# Patient Record
Sex: FEMALE | Race: WHITE | NOT HISPANIC OR LATINO | Employment: FULL TIME | URBAN - METROPOLITAN AREA
[De-identification: names, ages, dates, MRNs, and addresses within clinical notes are randomized per-mention and may not be internally consistent; named-entity substitution may affect disease eponyms.]

---

## 2017-04-26 ENCOUNTER — OFFICE VISIT (OUTPATIENT)
Dept: RETAIL CLINIC | Facility: CLINIC | Age: 27
End: 2017-04-26

## 2017-04-26 VITALS
TEMPERATURE: 99.1 F | DIASTOLIC BLOOD PRESSURE: 80 MMHG | HEART RATE: 116 BPM | OXYGEN SATURATION: 99 % | SYSTOLIC BLOOD PRESSURE: 128 MMHG | RESPIRATION RATE: 16 BRPM

## 2017-04-26 DIAGNOSIS — J11.1 INFLUENZA: Primary | ICD-10-CM

## 2017-04-26 DIAGNOSIS — H66.41 SUPPURATIVE OTITIS MEDIA OF RIGHT EAR, UNSPECIFIED CHRONICITY: ICD-10-CM

## 2017-04-26 PROCEDURE — 99213 OFFICE O/P EST LOW 20 MIN: CPT | Performed by: NURSE PRACTITIONER

## 2017-04-26 RX ORDER — OSELTAMIVIR PHOSPHATE 75 MG/1
75 CAPSULE ORAL 2 TIMES DAILY
Qty: 10 CAPSULE | Refills: 0 | Status: SHIPPED | OUTPATIENT
Start: 2017-04-26 | End: 2017-05-01

## 2017-04-26 RX ORDER — AMOXICILLIN 500 MG/1
500 CAPSULE ORAL 3 TIMES DAILY
Qty: 30 CAPSULE | Refills: 0 | Status: SHIPPED | OUTPATIENT
Start: 2017-04-26 | End: 2017-05-06

## 2017-04-26 NOTE — PROGRESS NOTES
Subjective:     Linda Li is a 26 y.o.     Earache    This is a new problem. The current episode started yesterday. The maximum temperature recorded prior to her arrival was 101 - 101.9 F. Associated symptoms include diarrhea, headaches and a sore throat. Pertinent negatives include no coughing or vomiting. Treatments tried: nyquil, mucinex, advil.         The following portions of the patient's history were reviewed and updated as appropriate: allergies, current medications, past family history, past medical history, past social history, past surgical history and problem list.      Review of Systems   Constitutional: Positive for appetite change, fatigue and fever.   HENT: Positive for congestion, ear pain, sinus pressure and sore throat.    Respiratory: Negative for cough, shortness of breath and wheezing.    Cardiovascular: Negative.    Gastrointestinal: Positive for diarrhea and nausea. Negative for vomiting.   Musculoskeletal: Positive for myalgias.   Skin: Negative for color change and pallor.   Neurological: Positive for headaches. Negative for dizziness and light-headedness.         Objective:      Physical Exam   Constitutional: She is oriented to person, place, and time. She appears well-developed and well-nourished. She is cooperative.   HENT:   Head: Normocephalic and atraumatic.   Right Ear: Tympanic membrane is erythematous and bulging.   Left Ear: Tympanic membrane is not erythematous and not bulging.   Nose: Nose normal.   Mouth/Throat: Posterior oropharyngeal erythema present. No oropharyngeal exudate.   Eyes: EOM and lids are normal. Pupils are equal, round, and reactive to light. Right conjunctiva is injected. Left conjunctiva is injected.   Neck: Normal range of motion. Neck supple.   Cardiovascular: Normal rate, regular rhythm, S1 normal, S2 normal and normal heart sounds.    Pulmonary/Chest: Effort normal and breath sounds normal.   Abdominal: Soft. Normal appearance and bowel sounds are  normal. There is no tenderness.   Musculoskeletal: Normal range of motion.   Lymphadenopathy:     She has cervical adenopathy.   Neurological: She is alert and oriented to person, place, and time.   Skin: Skin is warm, dry and intact.   Psychiatric: She has a normal mood and affect. Her speech is normal and behavior is normal.   Vitals reviewed.          Linda was seen today for earache.    Diagnoses and all orders for this visit:    Influenza    Suppurative otitis media of right ear, unspecified chronicity    Other orders  -     amoxicillin (AMOXIL) 500 MG capsule; Take 1 capsule by mouth 3 (Three) Times a Day for 10 days.  -     oseltamivir (TAMIFLU) 75 MG capsule; Take 1 capsule by mouth 2 (Two) Times a Day for 5 days.

## 2017-04-26 NOTE — PATIENT INSTRUCTIONS
Otitis Media, Adult  Otitis media is redness, soreness, and inflammation of the middle ear. Otitis media may be caused by allergies or, most commonly, by infection. Often it occurs as a complication of the common cold.  SIGNS AND SYMPTOMS  Symptoms of otitis media may include:  · Earache.  · Fever.  · Ringing in your ear.  · Headache.  · Leakage of fluid from the ear.  DIAGNOSIS  To diagnose otitis media, your health care provider will examine your ear with an otoscope. This is an instrument that allows your health care provider to see into your ear in order to examine your eardrum. Your health care provider also will ask you questions about your symptoms.  TREATMENT   Typically, otitis media resolves on its own within 3-5 days. Your health care provider may prescribe medicine to ease your symptoms of pain. If otitis media does not resolve within 5 days or is recurrent, your health care provider may prescribe antibiotic medicines if he or she suspects that a bacterial infection is the cause.  HOME CARE INSTRUCTIONS   · If you were prescribed an antibiotic medicine, finish it all even if you start to feel better.  · Take medicines only as directed by your health care provider.  · Keep all follow-up visits as directed by your health care provider.  SEEK MEDICAL CARE IF:  · You have otitis media only in one ear, or bleeding from your nose, or both.  · You notice a lump on your neck.  · You are not getting better in 3-5 days.  · You feel worse instead of better.  SEEK IMMEDIATE MEDICAL CARE IF:   · You have pain that is not controlled with medicine.  · You have swelling, redness, or pain around your ear or stiffness in your neck.  · You notice that part of your face is paralyzed.  · You notice that the bone behind your ear (mastoid) is tender when you touch it.  MAKE SURE YOU:   · Understand these instructions.  · Will watch your condition.  · Will get help right away if you are not doing well or get worse.     This  "information is not intended to replace advice given to you by your health care provider. Make sure you discuss any questions you have with your health care provider.     Document Released: 09/22/2005 Document Revised: 01/08/2016 Document Reviewed: 07/15/2014  [employer] Patient Education ©2016 [employer]  Influenza, Adult  Influenza, more commonly known as \"the flu,\" is a viral infection that primarily affects the respiratory tract. The respiratory tract includes organs that help you breathe, such as the lungs, nose, and throat. The flu causes many common cold symptoms, as well as a high fever and body aches.  The flu spreads easily from person to person (is contagious). Getting a flu shot (influenza vaccination) every year is the best way to prevent influenza.  CAUSES  Influenza is caused by a virus. You can catch the virus by:  · Breathing in droplets from an infected person's cough or sneeze.  · Touching something that was recently contaminated with the virus and then touching your mouth, nose, or eyes.  RISK FACTORS  The following factors may make you more likely to get the flu:  · Not cleaning your hands frequently with soap and water or alcohol-based hand .  · Having close contact with many people during cold and flu season.  · Touching your mouth, eyes, or nose without washing or sanitizing your hands first.  · Not drinking enough fluids or not eating a healthy diet.  · Not getting enough sleep or exercise.  · Being under a high amount of stress.  · Not getting a yearly (annual) flu shot.  You may be at a higher risk of complications from the flu, such as a severe lung infection (pneumonia), if you:  · Are over the age of 65.  · Are pregnant.  · Have a weakened disease-fighting system (immune system). You may have a weakened immune system if you:    Have HIV or AIDS.    Are undergoing chemotherapy.    Are taking medicines that reduce the activity of (suppress) the immune system.  · Have " a long-term (chronic) illness, such as heart disease, kidney disease, diabetes, or lung disease.  · Have a liver disorder.  · Are obese.  · Have anemia.  SYMPTOMS  Symptoms of this condition typically last 4-10 days and may include:  · Fever.  · Chills.  · Headache, body aches, or muscle aches.  · Sore throat.  · Cough.  · Runny or congested nose.  · Chest discomfort and cough.  · Poor appetite.  · Weakness or tiredness (fatigue).  · Dizziness.  · Nausea or vomiting.  DIAGNOSIS  This condition may be diagnosed based on your medical history and a physical exam. Your health care provider may do a nose or throat swab test to confirm the diagnosis.  TREATMENT  If influenza is detected early, you can be treated with antiviral medicine that can reduce the length of your illness and the severity of your symptoms. This medicine may be given by mouth (orally) or through an IV tube that is inserted in one of your veins.  The goal of treatment is to relieve symptoms by taking care of yourself at home. This may include taking over-the-counter medicines, drinking plenty of fluids, and adding humidity to the air in your home.  In some cases, influenza goes away on its own. Severe influenza or complications from influenza may be treated in a hospital.  HOME CARE INSTRUCTIONS  · Take over-the-counter and prescription medicines only as told by your health care provider.  · Use a cool mist humidifier to add humidity to the air in your home. This can make breathing easier.  · Rest as needed.  · Drink enough fluid to keep your urine clear or pale yellow.  · Cover your mouth and nose when you cough or sneeze.  · Wash your hands with soap and water often, especially after you cough or sneeze. If soap and water are not available, use hand .  · Stay home from work or school as told by your health care provider. Unless you are visiting your health care provider, try to avoid leaving home until your fever has been gone for 24 hours  without the use of medicine.  · Keep all follow-up visits as told by your health care provider. This is important.  PREVENTION  · Getting an annual flu shot is the best way to avoid getting the flu. You may get the flu shot in late summer, fall, or winter. Ask your health care provider when you should get your flu shot.  · Wash your hands often or use hand  often.  · Avoid contact with people who are sick during cold and flu season.  · Eat a healthy diet, drink plenty of fluids, get enough sleep, and exercise regularly.  SEEK MEDICAL CARE IF:  · You develop new symptoms.  · You have:    Chest pain.    Diarrhea.    A fever.  · Your cough gets worse.  · You produce more mucus.  · You feel nauseous or you vomit.  SEEK IMMEDIATE MEDICAL CARE IF:  · You develop shortness of breath or difficulty breathing.  · Your skin or nails turn a bluish color.  · You have severe pain or stiffness in your neck.  · You develop a sudden headache or sudden pain in your face or ear.  · You cannot stop vomiting.     This information is not intended to replace advice given to you by your health care provider. Make sure you discuss any questions you have with your health care provider.     Document Released: 12/15/2001 Document Revised: 01/13/2017 Document Reviewed: 10/11/2016  [employer] Interactive Patient Education ©2016 [employer]

## 2017-06-27 ENCOUNTER — TRANSCRIBE ORDERS (OUTPATIENT)
Dept: LAB | Facility: HOSPITAL | Age: 27
End: 2017-06-27

## 2017-06-27 ENCOUNTER — LAB (OUTPATIENT)
Dept: LAB | Facility: HOSPITAL | Age: 27
End: 2017-06-27

## 2017-06-27 DIAGNOSIS — H53.9 VISUAL CHANGES: Primary | ICD-10-CM

## 2017-06-27 DIAGNOSIS — H53.9 VISUAL CHANGES: ICD-10-CM

## 2017-06-27 LAB
ALBUMIN SERPL-MCNC: 4.5 G/DL (ref 3.5–5.2)
ALBUMIN/GLOB SERPL: 1.4 G/DL
ALP SERPL-CCNC: 62 U/L (ref 39–117)
ALT SERPL W P-5'-P-CCNC: 15 U/L (ref 1–33)
ANION GAP SERPL CALCULATED.3IONS-SCNC: 12 MMOL/L
AST SERPL-CCNC: 18 U/L (ref 1–32)
BILIRUB SERPL-MCNC: 1.1 MG/DL (ref 0.1–1.2)
BUN BLD-MCNC: 10 MG/DL (ref 6–20)
BUN/CREAT SERPL: 10.2 (ref 7–25)
CALCIUM SPEC-SCNC: 9.9 MG/DL (ref 8.6–10.5)
CHLORIDE SERPL-SCNC: 104 MMOL/L (ref 98–107)
CO2 SERPL-SCNC: 24 MMOL/L (ref 22–29)
CREAT BLD-MCNC: 0.98 MG/DL (ref 0.57–1)
GFR SERPL CREATININE-BSD FRML MDRD: 69 ML/MIN/1.73
GLOBULIN UR ELPH-MCNC: 3.2 GM/DL
GLUCOSE BLD-MCNC: 95 MG/DL (ref 65–99)
HBA1C MFR BLD: 4.9 % (ref 4.8–5.6)
POTASSIUM BLD-SCNC: 4.7 MMOL/L (ref 3.5–5.2)
PROT SERPL-MCNC: 7.7 G/DL (ref 6–8.5)
SODIUM BLD-SCNC: 140 MMOL/L (ref 136–145)

## 2017-06-27 PROCEDURE — 80053 COMPREHEN METABOLIC PANEL: CPT

## 2017-06-27 PROCEDURE — 36415 COLL VENOUS BLD VENIPUNCTURE: CPT

## 2017-06-27 PROCEDURE — 83036 HEMOGLOBIN GLYCOSYLATED A1C: CPT

## 2017-10-13 ENCOUNTER — TRANSCRIBE ORDERS (OUTPATIENT)
Dept: ADMINISTRATIVE | Facility: HOSPITAL | Age: 27
End: 2017-10-13

## 2017-10-13 DIAGNOSIS — M25.561 CHRONIC PAIN OF RIGHT KNEE: Primary | ICD-10-CM

## 2017-10-13 DIAGNOSIS — G89.29 CHRONIC PAIN OF RIGHT KNEE: Primary | ICD-10-CM

## 2017-10-17 ENCOUNTER — HOSPITAL ENCOUNTER (OUTPATIENT)
Dept: GENERAL RADIOLOGY | Facility: HOSPITAL | Age: 27
Discharge: HOME | End: 2017-10-17
Admitting: NURSE PRACTITIONER

## 2017-10-17 DIAGNOSIS — R52 PAIN: ICD-10-CM

## 2017-10-17 PROCEDURE — 73562 X-RAY EXAM OF KNEE 3: CPT

## 2017-10-24 ENCOUNTER — HOSPITAL ENCOUNTER (OUTPATIENT)
Dept: MRI IMAGING | Facility: HOSPITAL | Age: 27
Discharge: HOME | End: 2017-10-24
Admitting: NURSE PRACTITIONER

## 2017-10-24 DIAGNOSIS — G89.29 CHRONIC PAIN OF RIGHT KNEE: ICD-10-CM

## 2017-10-24 DIAGNOSIS — M25.561 CHRONIC PAIN OF RIGHT KNEE: ICD-10-CM

## 2017-10-24 PROCEDURE — 73721 MRI JNT OF LWR EXTRE W/O DYE: CPT

## 2017-10-27 ENCOUNTER — OFFICE VISIT (OUTPATIENT)
Dept: ORTHOPEDIC SURGERY | Facility: CLINIC | Age: 27
End: 2017-10-27

## 2017-10-27 VITALS — HEIGHT: 70 IN | BODY MASS INDEX: 33.64 KG/M2 | WEIGHT: 235 LBS | TEMPERATURE: 98.6 F

## 2017-10-27 DIAGNOSIS — M76.51 PATELLAR TENDINITIS OF RIGHT KNEE: Primary | ICD-10-CM

## 2017-10-27 PROCEDURE — 99203 OFFICE O/P NEW LOW 30 MIN: CPT | Performed by: ORTHOPAEDIC SURGERY

## 2017-10-27 RX ORDER — SUMATRIPTAN 5 MG/1
1 SPRAY NASAL
COMMUNITY
End: 2020-12-09

## 2017-10-27 RX ORDER — MELOXICAM 7.5 MG/1
15 TABLET ORAL DAILY
Status: SHIPPED | OUTPATIENT
Start: 2017-10-27 | End: 2017-12-26

## 2017-10-27 RX ORDER — MELOXICAM 15 MG/1
TABLET ORAL
Qty: 60 TABLET | Refills: 3 | Status: SHIPPED | OUTPATIENT
Start: 2017-10-27 | End: 2020-12-09
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
## 2017-11-02 ENCOUNTER — HOSPITAL ENCOUNTER (OUTPATIENT)
Dept: PHYSICAL THERAPY | Facility: HOSPITAL | Age: 27
Setting detail: THERAPIES SERIES
Discharge: HOME | End: 2017-11-02
Attending: ORTHOPAEDIC SURGERY

## 2017-11-02 DIAGNOSIS — M25.561 RIGHT KNEE PAIN, UNSPECIFIED CHRONICITY: Primary | ICD-10-CM

## 2017-11-02 DIAGNOSIS — Z74.09 IMPAIRED MOBILITY: ICD-10-CM

## 2017-11-02 PROCEDURE — 97161 PT EVAL LOW COMPLEX 20 MIN: CPT | Performed by: PHYSICAL THERAPIST

## 2017-11-02 PROCEDURE — 97110 THERAPEUTIC EXERCISES: CPT | Performed by: PHYSICAL THERAPIST
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
## 2017-11-17 ENCOUNTER — HOSPITAL ENCOUNTER (OUTPATIENT)
Dept: PHYSICAL THERAPY | Facility: HOSPITAL | Age: 27
Setting detail: THERAPIES SERIES
Discharge: HOME | End: 2017-11-17

## 2017-11-17 DIAGNOSIS — Z74.09 IMPAIRED MOBILITY: ICD-10-CM

## 2017-11-17 DIAGNOSIS — M25.561 RIGHT KNEE PAIN, UNSPECIFIED CHRONICITY: Primary | ICD-10-CM

## 2017-11-17 PROCEDURE — 97033 APP MDLTY 1+IONTPHRSIS EA 15: CPT | Performed by: PHYSICAL THERAPIST

## 2017-11-17 PROCEDURE — 97140 MANUAL THERAPY 1/> REGIONS: CPT | Performed by: PHYSICAL THERAPIST

## 2017-11-17 NOTE — THERAPY TREATMENT NOTE
Outpatient Physical Therapy Ortho Treatment Note  [facility]     Patient Name: Linda Li  : 1990  MRN: [id number]  Today's Date: 2017      Visit Date: 2017    Visit Dx:    ICD-10-CM ICD-9-CM   1. Right knee pain, unspecified chronicity M25.561 719.46   2. Impaired mobility Z74.09 799.89       There is no problem list on file for this patient.       Past Medical History:   Diagnosis Date   • Anxiety    • Depression    • Migraine    • Migraines         Past Surgical History:   Procedure Laterality Date   • WISDOM TOOTH EXTRACTION                               PT Assessment/Plan       17 1526       PT Assessment    Assessment Comments Ms. Li returns for her first follow up today, reporting no changes in her condition and reporting compliance with HEP every other day.  Today, we initaited trial of DDN to R glut tissues and R quad tissues.  She demonstrates several moderate level LTR's.  We also initiated a course of iontophoresis with dexamethasone.  She is TTP R inferior pole of patella.    -GJ     PT Plan    PT Plan Comments assess response to DDN and ionto.  Consider repeat of DDn to R quads if beneficial.  consider warm up on bike, Consider LAQ 90-45 degrees with weight, focus eccentric phase.  possible RLE only leg press.    -GJ       User Key  (r) = Recorded By, (t) = Taken By, (c) = Cosigned By    Initials Name Provider Type    GUALBERTO Olivares, PT Physical Therapist                Modalities       17 1300          Iontophoresis 07002    Rx Minutes 18  -GJ      MA/Min 40  -GJ      Dexamethasone used Yes   1.5 cc  -GJ      Patch Type Butterfly   to R inferior patellar pole  -GJ        User Key  (r) = Recorded By, (t) = Taken By, (c) = Cosigned By    Initials Name Provider Type    GUALBERTO Olivares, PT Physical Therapist                Exercises       17 1300          Subjective Comments    Subjective Comments I haven't noticed much of a difference, been doing  exercises every other day.   -GJ      Subjective Pain    Able to rate subjective pain? yes  -      Pre-Treatment Pain Level 5  -GJ      Exercise 9    Exercise Name 9 --  -GJ      Time (Minutes) 9 --  -GJ        User Key  (r) = Recorded By, (t) = Taken By, (c) = Cosigned By    Initials Name Provider Type    GUALBERTO Olivares PT Physical Therapist                        Manual Rx (last 36 hours)      Manual Treatments       11/17/17 1500          Manual Rx 1    Manual Rx 1 Location intramuscular manual therapy  -GJ Assessed pt. for Dry Needling and intramuscular manual therapy. Discussed risks/benefits of Dry Needling with pt, including but not limited to muscle soreness, bruising, vasovagal response, pneumothorax, nerve injury. Patient signed written consent to proceed with treatment.    Patient position during treatment: L SL with pillow between knees, supine with RLE rested on pillow.  Muscles treated: R glut medius, R piriformis.  R vastus lateralis, R vastus intermedius,   Response to treatment: several multiple LTR's all tissues needled.  She tolerated without immediate adverse response.      palpation used before, during, and after to facilitate assessment Clean needle technique observed at all times, precautions for lung fields, neurovascular structures observed.    DDN performed by Luiz Olivares II, PT, DPT       User Key  (r) = Recorded By, (t) = Taken By, (c) = Cosigned By    Initials Name Provider Type    GUALBERTO Olivares PT Physical Therapist                PT OP Goals       11/17/17 1300       PT Short Term Goals    STG Date to Achieve 12/07/17  -     STG 1 pt. to be I with initial HEP to facilitate self management of their condition  -GJ     STG 1 Progress Ongoing  -GJ     STG 1 Progress Comments reviewed  -GJ     STG 2 pt. to be educated in/verbalize understanding of the importance of posture/ergonomics in association with their condition to facilitate self management of their condition  -      STG 2 Progress Ongoing  -GJ     STG 2 Progress Comments reviwed  -GJ     STG 3 pt to report sleeping through the night without interruption secondary to her knee pain to facilate optimal healing  -GJ     STG 3 Progress Ongoing  -GJ     STG 3 Progress Comments continues to report sleep disruption  -GJ     Long Term Goals    LTG Date to Achieve 01/04/18  -GJ     LTG 1 pt. to be I with advanced HEP to facilitate self management of their condition  -GJ     LTG 1 Progress Ongoing  -GJ     LTG 2 pt. to report a KOS >/= 87% to demonstrate decreased level of perceived disability  -GJ     LTG 2 Progress Ongoing  -GJ     LTG 3 pt to ascend/descend stairs reciprocally without hand rails without exacerbation of her R knee pain to facilitate ease/safety with household/community mobility  -GJ     LTG 3 Progress Ongoing  -GJ     LTG 4 pt to report >/= 50% improvement in her R knee pain to facilitate ease/safety with performing household mobility  -GJ     LTG 4 Progress Ongoing  -GJ     LTG 4 Progress Comments pt reporting no changes in her condition   -GJ       User Key  (r) = Recorded By, (t) = Taken By, (c) = Cosigned By    Initials Name Provider Type    GUALBERTO Olivares PT Physical Therapist                Therapy Education       11/17/17 1523          Therapy Education    Education Details verbally reviewed HEP, no changes to HEP, continue to perform every other day.  reviewed activity modification.   reviewed anatomy of hip/knee lower kinetic chain, and phsyiology/time frame of healing.  -GJ      Given HEP;Symptoms/condition management;Pain management;Posture/body mechanics;Mobility training  -GJ      Program Reinforced  -GJ      How Provided Verbal  -GJ      Provided to Patient  -GJ      Level of Understanding Verbalized  -GJ        User Key  (r) = Recorded By, (t) = Taken By, (c) = Cosigned By    Initials Name Provider Type    GUALBERTO Olivares PT Physical Therapist                Time Calculation:   Start Time:  1400  Stop Time: 1500  Time Calculation (min): 60 min    Therapy Charges for Today     Code Description Service Date Service Provider Modifiers Qty    94540312055 HC PT IONTOPHORESIS EA 15 MIN 11/17/2017 Luiz Olivares, PT GP 1    60197978501 HC PT MANUAL THERAPY EA 15 MIN 11/17/2017 Luiz Olivares, PT GP 2                    Luiz Olivares, PT  11/17/2017

## 2017-11-28 ENCOUNTER — HOSPITAL ENCOUNTER (OUTPATIENT)
Dept: PHYSICAL THERAPY | Facility: HOSPITAL | Age: 27
Setting detail: THERAPIES SERIES
Discharge: HOME | End: 2017-11-28

## 2017-11-28 DIAGNOSIS — Z74.09 IMPAIRED MOBILITY: ICD-10-CM

## 2017-11-28 DIAGNOSIS — M25.561 RIGHT KNEE PAIN, UNSPECIFIED CHRONICITY: Primary | ICD-10-CM

## 2017-11-28 PROCEDURE — 97140 MANUAL THERAPY 1/> REGIONS: CPT | Performed by: PHYSICAL THERAPIST

## 2017-11-28 PROCEDURE — 97110 THERAPEUTIC EXERCISES: CPT | Performed by: PHYSICAL THERAPIST

## 2017-11-28 NOTE — THERAPY TREATMENT NOTE
Outpatient Physical Therapy Ortho Treatment Note  [facility]     Patient Name: Linda Li  : 1990  MRN: [id number]  Today's Date: 2017      Visit Date: 2017    Visit Dx:    ICD-10-CM ICD-9-CM   1. Right knee pain, unspecified chronicity M25.561 719.46   2. Impaired mobility Z74.09 799.89       There is no problem list on file for this patient.       Past Medical History:   Diagnosis Date   • Anxiety    • Depression    • Migraine    • Migraines         Past Surgical History:   Procedure Laterality Date   • WISDOM TOOTH EXTRACTION                               PT Assessment/Plan       17 1606       PT Assessment    Assessment Comments Ms. Li returns reporting slight exacerbation in R knee pain following a weekend of being on her feet a lot/walking a lot.  She has obtained a new pair of shoes.  We ramped up her strengthening today, adding leg press and increasing resistance .  She demonstrated decreased frequency/intensity of LTR's during DDN of R glut and quad tissues.   -GJ     PT Plan    PT Plan Comments assess response to DDN, warm up on Nustep/bike.  Progress hip girdle/core/louisa/HS strengthening.  consider mini lunge loading of R qud tissue with good alightment of LE.  -GJ       User Key  (r) = Recorded By, (t) = Taken By, (c) = Cosigned By    Initials Name Provider Type    GUALBERTO Olivares, PT Physical Therapist                    Exercises       17 1500          Subjective Comments    Subjective Comments I wore bad shoes over the weekend.  I felt the pain in my queds for week after   -GJ      Exercise 1    Exercise Name 1 SL hip abd, bilaterally  -GJ      Cueing 1 Verbal  -GJ      Sets 1 2  -GJ      Reps 1 10  -GJ      Additional Comments 2#  -GJ      Exercise 4    Exercise Name 4 standing HS curl  -GJ      Cueing 4 Verbal  -GJ      Sets 4 2  -GJ      Reps 4 10  -GJ      Additional Comments 3#  -GJ      Exercise 5    Exercise Name 5 leg press, RLE only  -GJ       Cueing 5 Demo  -GJ      Sets 5 2  -GJ      Reps 5 10  -GJ      Additional Comments 100#  -GJ      Exercise 9    Exercise Name 9 prone hip ext  -GJ      Cueing 9 Verbal  -GJ      Sets 9 2  -GJ      Reps 9 10  -GJ      Additional Comments 2#  -GJ      Exercise 10    Exercise Name 10 LAQ, 5 seconds up/5 seconds down  -GJ      Sets 10 2  -GJ      Reps 10 10  -GJ      Additional Comments 5#  -GJ      Exercise 11    Exercise Name 11 3 way ankle R  -GJ      Cueing 11 Demo  -GJ      Sets 11 2  -GJ      Reps 11 10  -GJ      Additional Comments GTB  -GJ      Exercise 12    Exercise Name 12 SAQ  -GJ      Cueing 12 Demo  -GJ      Sets 12 2  -GJ      Reps 12 10  -GJ      Additional Comments 5#  -GJ        User Key  (r) = Recorded By, (t) = Taken By, (c) = Cosigned By    Initials Name Provider Type    GUALBERTO Olivares, PT Physical Therapist                        Manual Rx (last 36 hours)      Manual Treatments       11/28/17 1500          Manual Rx 1    Manual Rx 1 Location intramuscular manual therapy  - Assessed pt. for Dry Needling and intramuscular manual therapy. Discussed risks/benefits of Dry Needling with pt, including but not limited to muscle soreness, bruising, vasovagal response, pneumothorax, nerve injury. Patient signed written consent to proceed with treatment.    Patient position during treatment: L SL with pillow between knee, supine with RLE supported on pillow  Muscles treated: R lgut medius, R piriformis, R quad tissues.  Response to treatment: 2-4 moderate LTR's R glut/piriformis tissue, several moderate LTR's R quad tissue, noted decrease in intensity/frequency of LTR's.     palpation used before, during, and after to facilitate assessment Clean needle technique observed at all times, precautions for lung fields, neurovascular structures observed.    DDN performed by Luiz Olivares II, PT, DPT       User Key  (r) = Recorded By, (t) = Taken By, (c) = Cosigned By    Initials Name Provider Type    GJ  Luiz Olivares, PT Physical Therapist                PT OP Goals       11/28/17 1600       PT Short Term Goals    STG Date to Achieve 12/07/17  -GJ     STG 1 pt. to be I with initial HEP to facilitate self management of their condition  -GJ     STG 1 Progress Met  -GJ     STG 2 pt. to be educated in/verbalize understanding of the importance of posture/ergonomics in association with their condition to facilitate self management of their condition  -GJ     STG 2 Progress Met  -GJ     STG 3 pt to report sleeping through the night without interruption secondary to her knee pain to facilate optimal healing  -GJ     STG 3 Progress Ongoing  -GJ     Long Term Goals    LTG Date to Achieve 01/04/18  -GJ     LTG 1 pt. to be I with advanced HEP to facilitate self management of their condition  -GJ     LTG 1 Progress Ongoing  -GJ     LTG 2 pt. to report a KOS >/= 87% to demonstrate decreased level of perceived disability  -GJ     LTG 2 Progress Ongoing  -GJ     LTG 3 pt to ascend/descend stairs reciprocally without hand rails without exacerbation of her R knee pain to facilitate ease/safety with household/community mobility  -GJ     LTG 3 Progress Ongoing  -GJ     LTG 4 pt to report >/= 50% improvement in her R knee pain to facilitate ease/safety with performing household mobility  -GJ     LTG 4 Progress Ongoing  -GJ       User Key  (r) = Recorded By, (t) = Taken By, (c) = Cosigned By    Initials Name Provider Type    GJ Luiz Olivares, PT Physical Therapist                Therapy Education       11/28/17 1604          Therapy Education    Education Details discussed obtaining adjustable ankle weights for home, HEP to continue to be every other day  -GJ      Given HEP;Symptoms/condition management;Pain management;Posture/body mechanics;Mobility training  -GJ      Program New  -GJ      How Provided Verbal;Demonstration;Written  -GJ      Provided to Patient  -GJ      Level of Understanding Teach back education  performed;Verbalized;Demonstrated  -GUALBERTO        User Key  (r) = Recorded By, (t) = Taken By, (c) = Cosigned By    Initials Name Provider Type    GUALBERTO Olivares, PT Physical Therapist                Time Calculation:   Start Time: 1450  Stop Time: 1538  Time Calculation (min): 48 min    Therapy Charges for Today     Code Description Service Date Service Provider Modifiers Qty    89635581074  PT MANUAL THERAPY EA 15 MIN 11/28/2017 Luiz Olivares, PT GP 1    [id number]  PT THER PROC EA 15 MIN 11/28/2017 Luiz Olivares, PT GP 2                    Luiz Olivares, PT  11/28/2017

## 2017-12-05 ENCOUNTER — HOSPITAL ENCOUNTER (OUTPATIENT)
Dept: PHYSICAL THERAPY | Facility: HOSPITAL | Age: 27
Setting detail: THERAPIES SERIES
Discharge: HOME | End: 2017-12-05

## 2017-12-05 DIAGNOSIS — M25.561 RIGHT KNEE PAIN, UNSPECIFIED CHRONICITY: Primary | ICD-10-CM

## 2017-12-05 DIAGNOSIS — Z74.09 IMPAIRED MOBILITY: ICD-10-CM

## 2017-12-05 PROCEDURE — 97110 THERAPEUTIC EXERCISES: CPT | Performed by: PHYSICAL THERAPIST

## 2017-12-05 NOTE — THERAPY TREATMENT NOTE
Outpatient Physical Therapy Ortho Treatment Note  [facility]     Patient Name: Linda Li  : 1990  MRN: [id number]  Today's Date: 2017      Visit Date: 2017    Visit Dx:    ICD-10-CM ICD-9-CM   1. Right knee pain, unspecified chronicity M25.561 719.46   2. Impaired mobility Z74.09 799.89       There is no problem list on file for this patient.       Past Medical History:   Diagnosis Date   • Anxiety    • Depression    • Migraine    • Migraines         Past Surgical History:   Procedure Laterality Date   • WISDOM TOOTH EXTRACTION                               PT Assessment/Plan       17 1138       PT Assessment    Assessment Comments Ms. Li demonstrates continued R hip extension/abduction strength.  Today, we held on DDN and focused on strengthening, We increased resistance and added SLS with shoulder ext to challenge proprioception/entire lower kinectic chain.  [place] of McConnel tape to R patella (medial pull) to assess response with taping.    -GJ     PT Plan    PT Plan Comments assess response to Hummel tape.  Continue to progress hip girdle, quad/HS, ankle strengthening.  may consider step up forward/lateral  -GJ       User Key  (r) = Recorded By, (t) = Taken By, (c) = Cosigned By    Initials Name Provider Type    GUALBERTO Olivares, PT Physical Therapist                    Exercises       17 1000          Subjective Comments    Subjective Comments Yesterday was a bad day for some reason, really felt a lot of pinching, I had some swelling, going I did some ice.  I was a little sore after last session, but not bad.   -GJ      Subjective Pain    Pre-Treatment Pain Level 4  -GJ      Exercise 1    Exercise Name 1 SL hip abd, bilaterally  -GJ      Cueing 1 Verbal  -GJ      Sets 1 2  -GJ      Reps 1 10  -GJ      Additional Comments 2#  -GJ      Exercise 3    Exercise Name 3 HR, from step,   -GJ      Cueing 3 Demo  -GJ      Sets 3 2  -GJ      Reps 3 10  -GJ      Exercise 4     Exercise Name 4 standing HS curl  -GJ      Cueing 4 Verbal  -GJ      Sets 4 2  -GJ      Reps 4 10  -GJ      Additional Comments 4#  -GJ      Exercise 5    Exercise Name 5 leg press, RLE only  -GJ      Cueing 5 Demo  -GJ      Sets 5 2  -GJ      Reps 5 20  -GJ      Additional Comments 100#  -GJ      Exercise 9    Exercise Name 9 prone hip ext  -GJ      Cueing 9 Verbal  -GJ      Sets 9 2  -GJ      Reps 9 10  -GJ      Additional Comments 2#  -GJ      Exercise 10    Exercise Name 10 LAQ, 5 seconds up/5 seconds down  -GJ      Cueing 10 Verbal  -GJ      Sets 10 2  -GJ      Reps 10 10  -GJ      Additional Comments 7#  -GJ      Exercise 11    Exercise Name 11 3 way ankle R  -GJ      Cueing 11 Demo  -GJ      Sets 11 2  -GJ      Reps 11 10  -GJ      Additional Comments GTB  -GJ      Exercise 12    Exercise Name 12 SAQ  -GJ      Cueing 12 Demo  -GJ      Sets 12 2  -GJ      Reps 12 10  -GJ      Additional Comments 7#  -GJ      Exercise 13    Exercise Name 13 recumbent bike  -GJ      Time (Minutes) 13 7  -GJ      Exercise 14    Exercise Name 14 SLR   -GJ      Cueing 14 Verbal  -GJ      Reps 14 20  -GJ      Additional Comments 2#  -GJ      Exercise 15    Exercise Name 15 SLS shoulder ext  -GJ      Cueing 15 Demo  -GJ      Sets 15 2  -GJ      Reps 15 10  -GJ      Additional Comments RTB  -GJ      Exercise 16    Exercise Name 16 trial McConnel taping to R knee, medial pull, pt educated to remove if irritation occurs.  -GJ        User Key  (r) = Recorded By, (t) = Taken By, (c) = Cosigned By    Initials Name Provider Type    GJ Luiz Olivares, PT Physical Therapist                               PT OP Goals       12/05/17 1000       PT Short Term Goals    STG Date to Achieve 12/07/17  -GJ     STG 1 pt. to be I with initial HEP to facilitate self management of their condition  -GJ     STG 1 Progress Met  -GJ     STG 2 pt. to be educated in/verbalize understanding of the importance of posture/ergonomics in association with their  condition to facilitate self management of their condition  -GJ     STG 2 Progress Met  -GJ     STG 3 pt to report sleeping through the night without interruption secondary to her knee pain to facilate optimal healing  -GJ     STG 3 Progress Ongoing  -GJ     Long Term Goals    LTG Date to Achieve 01/04/18  -GJ     LTG 1 pt. to be I with advanced HEP to facilitate self management of their condition  -GJ     LTG 1 Progress Ongoing  -GJ     LTG 2 pt. to report a KOS >/= 87% to demonstrate decreased level of perceived disability  -GJ     LTG 2 Progress Ongoing  -GJ     LTG 3 pt to ascend/descend stairs reciprocally without hand rails without exacerbation of her R knee pain to facilitate ease/safety with household/community mobility  -GJ     LTG 3 Progress Ongoing  -GJ     LTG 4 pt to report >/= 50% improvement in her R knee pain to facilitate ease/safety with performing household mobility  -GJ     LTG 4 Progress Ongoing  -GJ       User Key  (r) = Recorded By, (t) = Taken By, (c) = Cosigned By    Initials Name Provider Type    GUALBERTO Olivares PT Physical Therapist                Therapy Education       12/05/17 1137          Therapy Education    Given HEP;Symptoms/condition management;Pain management;Posture/body mechanics  -GJ      Program New  -GJ      How Provided Verbal;Demonstration;Written  -GJ      Provided to Patient  -GJ      Level of Understanding Teach back education performed;Verbalized;Demonstrated  -GJ        User Key  (r) = Recorded By, (t) = Taken By, (c) = Cosigned By    Initials Name Provider Type    GUALBERTO Olivares PT Physical Therapist                Time Calculation:   Start Time: 1040  Stop Time: 1120  Time Calculation (min): 40 min    Therapy Charges for Today     Code Description Service Date Service Provider Modifiers Qty    [id number]  PT THER PROC EA 15 MIN 12/5/2017 Luiz Olivares, PT GP 3                    Luiz Olivares, PT  12/5/2017

## 2017-12-12 ENCOUNTER — HOSPITAL ENCOUNTER (OUTPATIENT)
Dept: PHYSICAL THERAPY | Facility: HOSPITAL | Age: 27
Setting detail: THERAPIES SERIES
Discharge: HOME | End: 2017-12-12

## 2017-12-12 DIAGNOSIS — M25.561 RIGHT KNEE PAIN, UNSPECIFIED CHRONICITY: Primary | ICD-10-CM

## 2017-12-12 DIAGNOSIS — Z74.09 IMPAIRED MOBILITY: ICD-10-CM

## 2017-12-12 PROCEDURE — 97140 MANUAL THERAPY 1/> REGIONS: CPT | Performed by: PHYSICAL THERAPIST

## 2017-12-12 PROCEDURE — 97110 THERAPEUTIC EXERCISES: CPT | Performed by: PHYSICAL THERAPIST

## 2017-12-12 NOTE — THERAPY TREATMENT NOTE
Outpatient Physical Therapy Ortho Treatment Note  [facility]     Patient Name: Linda Li  : 1990  MRN: [id number]  Today's Date: 2017      Visit Date: 2017    Visit Dx:    ICD-10-CM ICD-9-CM   1. Right knee pain, unspecified chronicity M25.561 719.46   2. Impaired mobility Z74.09 799.89       There is no problem list on file for this patient.       Past Medical History:   Diagnosis Date   • Anxiety    • Depression    • Migraine    • Migraines         Past Surgical History:   Procedure Laterality Date   • WISDOM TOOTH EXTRACTION                               PT Assessment/Plan       17 1031       PT Assessment    Assessment Comments Ms. Li reports decreased frequency of R knee pain (not daily as before) but more intense.  We progressed her strengthening exercises today, including standing hip abd/ext bilaterally with contorl.  We re-taped her R knee ([place]) and performed DDN to R medial gastroc tissue.  She demonstrates improving form with exercises and demonstrates deccreased crepitius. Ms. Li continues to be a good candidate for skilled physical therapy.   -GJ     PT Plan    PT Plan Comments assess response to DDN of R medial gastroc and Hummel taping.  Continue to strenghten lower kinetic chain, consider step up forward/lateral.  re tape if beneficial, DDN as appropriate  -       User Key  (r) = Recorded By, (t) = Taken By, (c) = Cosigned By    Initials Name Provider Type    GUALBERTO Olivares, PT Physical Therapist                    Exercises       17 0900          Subjective Comments    Subjective Comments I liked the support of the tape, I find that I don't have pain every day, but now when I do have pain it seems worse.   -GJ      Subjective Pain    Pre-Treatment Pain Level 6  -GJ      Exercise 1    Exercise Name 1 SL hip abd, bilaterally  -GJ      Cueing 1 Verbal  -GJ      Sets 1 2  -GJ      Reps 1 10  -GJ      Additional Comments 2#  -GJ      Exercise  3    Exercise Name 3 HR, from step,   -GJ      Cueing 3 Verbal  -GJ      Sets 3 2  -GJ      Reps 3 10  -GJ      Exercise 4    Exercise Name 4 standing HS curl  -GJ      Cueing 4 Verbal  -GJ      Sets 4 2  -GJ      Reps 4 10  -GJ      Additional Comments 5#  -GJ      Exercise 5    Exercise Name 5 leg press, RLE only  -GJ      Cueing 5 Demo  -GJ      Sets 5 2  -GJ      Reps 5 20  -GJ      Additional Comments 110#  -GJ      Exercise 9    Exercise Name 9 prone hip ext  -GJ      Cueing 9 Verbal  -GJ      Sets 9 2  -GJ      Reps 9 10  -GJ      Additional Comments 2#  -GJ      Exercise 10    Exercise Name 10 LAQ, 5 seconds up/5 seconds down  -GJ      Cueing 10 Verbal  -GJ      Sets 10 2  -GJ      Reps 10 10  -GJ      Additional Comments 7#  -GJ      Exercise 11    Exercise Name 11 3 way ankle R  -GJ      Cueing 11 Demo  -GJ      Sets 11 2  -GJ      Reps 11 10  -GJ      Additional Comments GTB  -GJ      Exercise 12    Exercise Name 12 SAQ  -GJ      Cueing 12 Demo  -GJ      Sets 12 2  -GJ      Reps 12 10  -GJ      Additional Comments 7#  -GJ      Exercise 13    Exercise Name 13 recumbent bike  -GJ      Time (Minutes) 13 5  -GJ      Exercise 14    Exercise Name 14 SLR   -GJ      Cueing 14 Verbal  -GJ      Reps 14 20  -GJ      Additional Comments 2#  -GJ      Exercise 15    Exercise Name 15 SLS shoulder ext  -GJ      Cueing 15 Demo  -GJ      Sets 15 2  -GJ      Reps 15 10  -GJ      Additional Comments GTB  -GJ      Exercise 16    Exercise Name 16 Hummel tape, R knee   -GJ      Exercise 17    Exercise Name 17 standing hip abd/ext, bilaterally 1 hand  -GJ      Cueing 17 Demo  -GJ      Sets 17 2  -GJ      Reps 17 10  -GJ      Additional Comments 5#  -GJ        User Key  (r) = Recorded By, (t) = Taken By, (c) = Cosigned By    Initials Name Provider Type    GUALBERTO Olivares PT Physical Therapist                        Manual Rx (last 36 hours)      Manual Treatments       12/12/17 0900          Manual Rx 1    Manual Rx 1  Location intramuscular manual therapy  -GJ Assessed pt. for Dry Needling and intramuscular manual therapy. Discussed risks/benefits of Dry Needling with pt, including but not limited to muscle soreness, bruising, vasovagal response, pneumothorax, nerve injury. Patient signed written consent to proceed with treatment.    Patient position during treatment: prone, R ankle rested on bolster.  Muscles treated: R medial gastroc  Response to treatment: several moderate to large LTR's.  Pt. Tolerated without immediate adverse response.     palpation used before, during, and after to facilitate assessment Clean needle technique observed at all times, precautions for lung fields, neurovascular structures observed.    DDN performed by Luiz Olivares II, PT, DPT       User Key  (r) = Recorded By, (t) = Taken By, (c) = Cosigned By    Initials Name Provider Type    GJ Luiz Olivares, PT Physical Therapist                PT OP Goals       12/12/17 0900       PT Short Term Goals    STG Date to Achieve 12/07/17  -GJ     STG 1 pt. to be I with initial HEP to facilitate self management of their condition  -GJ     STG 1 Progress Met  -GJ     STG 2 pt. to be educated in/verbalize understanding of the importance of posture/ergonomics in association with their condition to facilitate self management of their condition  -GJ     STG 2 Progress Met  -GJ     STG 3 pt to report sleeping through the night without interruption secondary to her knee pain to facilate optimal healing  -GJ     STG 3 Progress Progressing  -GJ     Long Term Goals    LTG Date to Achieve 01/04/18  -GJ     LTG 1 pt. to be I with advanced HEP to facilitate self management of their condition  -GJ     LTG 1 Progress Ongoing  -GJ     LTG 2 pt. to report a KOS >/= 87% to demonstrate decreased level of perceived disability  -GJ     LTG 2 Progress Ongoing  -GJ     LTG 3 pt to ascend/descend stairs reciprocally without hand rails without exacerbation of her R knee pain to  facilitate ease/safety with household/community mobility  -GJ     LTG 3 Progress Ongoing  -GJ     LTG 4 pt to report >/= 50% improvement in her R knee pain to facilitate ease/safety with performing household mobility  -GJ     LTG 4 Progress Ongoing  -GJ       User Key  (r) = Recorded By, (t) = Taken By, (c) = Cosigned By    Initials Name Provider Type    GJ Luiz Olivares, PT Physical Therapist          Therapy Education  Education Details: reviewed physiology of healing, jay given chronic nature of condition and discussed that she may be having more intense pain but with less frequency.  Continue with exercies every other day, to peroform bilaterally at home   Given: HEP, Symptoms/condition management, Pain management, Posture/body mechanics, Mobility training, Edema management  Program: New  How Provided: Verbal, Demonstration, Written  Provided to: Patient  Level of Understanding: Teach back education performed, Demonstrated, Verbalized              Time Calculation:   Start Time: 0920  Stop Time: 1018  Time Calculation (min): 58 min    Therapy Charges for Today     Code Description Service Date Service Provider Modifiers Qty    64767957813  PT THER PROC EA 15 MIN 12/12/2017 Luiz Olivares, PT GP 3    [id number]  PT MANUAL THERAPY EA 15 MIN 12/12/2017 Luiz Olivares, PT GP 1                    Luiz Olivares, PT  12/12/2017

## 2017-12-19 ENCOUNTER — HOSPITAL ENCOUNTER (OUTPATIENT)
Dept: PHYSICAL THERAPY | Facility: HOSPITAL | Age: 27
Setting detail: THERAPIES SERIES
Discharge: HOME | End: 2017-12-19

## 2017-12-19 DIAGNOSIS — Z74.09 IMPAIRED MOBILITY: ICD-10-CM

## 2017-12-19 DIAGNOSIS — M25.561 RIGHT KNEE PAIN, UNSPECIFIED CHRONICITY: Primary | ICD-10-CM

## 2017-12-19 PROCEDURE — 97110 THERAPEUTIC EXERCISES: CPT | Performed by: PHYSICAL THERAPIST

## 2017-12-19 PROCEDURE — 97140 MANUAL THERAPY 1/> REGIONS: CPT | Performed by: PHYSICAL THERAPIST
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
## 2018-01-02 ENCOUNTER — HOSPITAL ENCOUNTER (OUTPATIENT)
Dept: PHYSICAL THERAPY | Facility: HOSPITAL | Age: 28
Setting detail: THERAPIES SERIES
Discharge: HOME | End: 2018-01-02

## 2018-01-02 DIAGNOSIS — Z74.09 IMPAIRED MOBILITY: ICD-10-CM

## 2018-01-02 DIAGNOSIS — M25.561 RIGHT KNEE PAIN, UNSPECIFIED CHRONICITY: Primary | ICD-10-CM

## 2018-01-02 PROCEDURE — 97110 THERAPEUTIC EXERCISES: CPT | Performed by: PHYSICAL THERAPIST
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
## 2018-01-11 ENCOUNTER — HOSPITAL ENCOUNTER (OUTPATIENT)
Dept: PHYSICAL THERAPY | Facility: HOSPITAL | Age: 28
Setting detail: THERAPIES SERIES
Discharge: HOME | End: 2018-01-11

## 2018-01-11 DIAGNOSIS — M25.561 RIGHT KNEE PAIN, UNSPECIFIED CHRONICITY: ICD-10-CM

## 2018-01-11 DIAGNOSIS — Z74.09 IMPAIRED MOBILITY: Primary | ICD-10-CM

## 2018-01-11 PROCEDURE — 97110 THERAPEUTIC EXERCISES: CPT | Performed by: PHYSICAL THERAPIST
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
## 2018-01-18 ENCOUNTER — APPOINTMENT (OUTPATIENT)
Dept: PHYSICAL THERAPY | Facility: HOSPITAL | Age: 28
End: 2018-01-18

## 2018-01-25 ENCOUNTER — HOSPITAL ENCOUNTER (OUTPATIENT)
Dept: PHYSICAL THERAPY | Facility: HOSPITAL | Age: 28
Setting detail: THERAPIES SERIES
Discharge: HOME | End: 2018-01-25

## 2018-01-25 DIAGNOSIS — M25.561 RIGHT KNEE PAIN, UNSPECIFIED CHRONICITY: ICD-10-CM

## 2018-01-25 DIAGNOSIS — Z74.09 IMPAIRED MOBILITY: Primary | ICD-10-CM

## 2018-01-25 PROCEDURE — 97110 THERAPEUTIC EXERCISES: CPT | Performed by: PHYSICAL THERAPIST

## 2018-01-25 NOTE — THERAPY TREATMENT NOTE
Outpatient Physical Therapy Ortho Treatment Note  [facility]     Patient Name: Linda Li  : 1990  MRN: [id number]  Today's Date: 2018      Visit Date: 2018    Visit Dx:    ICD-10-CM ICD-9-CM   1. Impaired mobility Z74.09 799.89   2. Right knee pain, unspecified chronicity M25.561 719.46       There is no problem list on file for this patient.       Past Medical History:   Diagnosis Date   • Anxiety    • Depression    • Migraine    • Migraines         Past Surgical History:   Procedure Laterality Date   • WISDOM TOOTH EXTRACTION                               PT Assessment/Plan       18 1507       PT Assessment    Assessment Comments Ms. Li returns today reporting minimal if any change in her condition. She continues to report elevated knee pain. She continues to demonstrate decreased RLE strength/control with single limp events (RLE).  Secondary to limited progress and continued elevated pain levels we will return to see MD for reassessment.  Formal PT is on hold pending MD assessment.  ? undiagnosed meniscal injury, plica injury, ?  -GJ     PT Plan    PT Plan Comments Formal PT on hold until she has reassessment from MD.    -GJ       User Key  (r) = Recorded By, (t) = Taken By, (c) = Cosigned By    Initials Name Provider Type    GUALBERTO Olivares, PT Physical Therapist                Modalities       18 1400          Ice    Patient denies application of Ice Yes  -GJ        User Key  (r) = Recorded By, (t) = Taken By, (c) = Cosigned By    Initials Name Provider Type    GUALBERTO Olivares, PT Physical Therapist                Exercises       18 1400          Subjective Comments    Subjective Comments my knee is hurting pretty bad today, I haven't seen Dr. Jaimes yet.  I really don't know  if I'm getting much better  -GJ      Subjective Pain    Pre-Treatment Pain Level 6  -GJ      Exercise 1    Exercise Name 1 SL hip abd, bilaterally  -GJ      Cueing 1 Verbal  -GJ       Sets 1 2  -GJ      Reps 1 10  -GJ      Additional Comments 2#  -GJ      Exercise 3    Exercise Name 3 HR, from step,   -GJ      Cueing 3 Verbal  -GJ      Sets 3 2  -GJ      Reps 3 10  -GJ      Exercise 4    Exercise Name 4 standing HS curl  -GJ      Cueing 4 Verbal  -GJ      Sets 4 2  -GJ      Reps 4 10  -GJ      Additional Comments 5#  -GJ      Exercise 5    Exercise Name 5 leg press, RLE only  -GJ      Cueing 5 Demo  -GJ      Sets 5 2  -GJ      Reps 5 20  -GJ      Additional Comments 120#  -GJ      Exercise 9    Exercise Name 9 prone hip ext  -GJ      Cueing 9 Verbal  -GJ      Sets 9 2  -GJ      Reps 9 10  -GJ      Additional Comments 2#  -GJ      Exercise 10    Exercise Name 10 LAQ, 5 seconds up/5 seconds down  -GJ      Cueing 10 Verbal  -GJ      Sets 10 2  -GJ      Reps 10 10  -GJ      Additional Comments 7#  -GJ      Exercise 11    Exercise Name 11 3 way ankle R  -GJ      Cueing 11 Demo  -GJ      Sets 11 2  -GJ      Reps 11 10  -GJ      Additional Comments GTB  -GJ      Exercise 12    Exercise Name 12 SAQ  -GJ      Cueing 12 Demo  -GJ      Sets 12 2  -GJ      Reps 12 10  -GJ      Additional Comments 7#  -GJ      Exercise 13    Exercise Name 13 recumbent bike  -GJ      Time (Minutes) 13 5  -GJ      Exercise 14    Exercise Name 14 SLR   -GJ      Cueing 14 Verbal  -GJ      Reps 14 20  -GJ      Additional Comments 2#  -GJ      Exercise 15    Exercise Name 15 SLS shoulder ext, on blue foam  -GJ      Cueing 15 Demo  -GJ      Sets 15 2  -GJ      Reps 15 10  -GJ      Additional Comments GTB  -GJ      Exercise 16    Exercise Name 16 SLS balance throwing ball to trampolene  -GJ      Cueing 16 Demo  -GJ      Sets 16 2  -GJ      Reps 16 15  -GJ      Exercise 17    Exercise Name 17 standing hip abd/ext, bilaterally 1 hand  -GJ      Cueing 17 Demo  -GJ      Sets 17 2  -GJ      Reps 17 10  -GJ      Additional Comments 5#  -GJ      Exercise 18    Exercise Name 18 step up to bosu on R  -GJ      Cueing 18 Demo  -GJ      Reps 18  20  -GJ      Exercise 19    Exercise Name 19 piriformis stretch  -GJ      Cueing 19 Demo  -GJ      Reps 19 3  -GJ      Time (Seconds) 19 20  -GJ        User Key  (r) = Recorded By, (t) = Taken By, (c) = Cosigned By    Initials Name Provider Type    GUALBERTO Olivares, PT Physical Therapist                               PT OP Goals       01/25/18 1400       PT Short Term Goals    STG Date to Achieve 12/07/17  -GJ     STG 1 pt. to be I with initial HEP to facilitate self management of their condition  -GJ     STG 1 Progress Met  -GJ     STG 2 pt. to be educated in/verbalize understanding of the importance of posture/ergonomics in association with their condition to facilitate self management of their condition  -GJ     STG 2 Progress Met  -GJ     STG 3 pt to report sleeping through the night without interruption secondary to her knee pain to facilate optimal healing  -GJ     STG 3 Progress Met  -GJ     Long Term Goals    LTG Date to Achieve 03/06/18  -GJ     LTG 1 pt. to be I with advanced HEP to facilitate self management of their condition  -GJ     LTG 1 Progress Ongoing  -GJ     LTG 2 pt. to report a KOS >/= 87% to demonstrate decreased level of perceived disability  -GJ     LTG 2 Progress Ongoing  -GJ     LTG 3 pt to ascend/descend stairs reciprocally without hand rails without exacerbation of her R knee pain to facilitate ease/safety with household/community mobility  -GJ     LTG 3 Progress Partially Met  -GJ     LTG 4 pt to report >/= 50% improvement in her R knee pain to facilitate ease/safety with performing household mobility  -GJ     LTG 4 Progress Ongoing  -GJ       User Key  (r) = Recorded By, (t) = Taken By, (c) = Cosigned By    Initials Name Provider Type    GUALBERTO Olivares, PT Physical Therapist          Therapy Education  Education Details: discussed returning to MD and holding formal PT unitl Md assessment secondary to limited progress, note sent to MD  Given: HEP, Symptoms/condition management, Pain  management, Posture/body mechanics, Mobility training  Program: Reinforced  How Provided: Verbal  Provided to: Patient  Level of Understanding: Verbalized              Time Calculation:   Start Time: 1400  Stop Time: 1446  Time Calculation (min): 46 min    Therapy Charges for Today     Code Description Service Date Service Provider Modifiers Qty    24110438813 HC PT THER PROC EA 15 MIN 1/25/2018 Luiz Olivares, PT GP 3                    Luiz Olivares, PT  1/25/2018

## 2018-01-31 ENCOUNTER — APPOINTMENT (OUTPATIENT)
Dept: PHYSICAL THERAPY | Facility: HOSPITAL | Age: 28
End: 2018-01-31

## 2018-03-01 ENCOUNTER — DOCUMENTATION (OUTPATIENT)
Dept: PHYSICAL THERAPY | Facility: HOSPITAL | Age: 28
End: 2018-03-01

## 2018-03-01 DIAGNOSIS — Z74.09 IMPAIRED MOBILITY: Primary | ICD-10-CM

## 2018-03-01 DIAGNOSIS — M25.561 RIGHT KNEE PAIN, UNSPECIFIED CHRONICITY: ICD-10-CM

## 2018-03-01 NOTE — THERAPY DISCHARGE NOTE
Outpatient Physical Therapy Discharge Summary         Patient Name: Linda Li  : 1990  MRN: [id number]    Today's Date: 3/1/2018    Visit Dx:    ICD-10-CM ICD-9-CM   1. Impaired mobility Z74.09 799.89   2. Right knee pain, unspecified chronicity M25.561 719.46             PT OP Goals       18 0800       PT Short Term Goals    STG Date to Achieve 17  -GJ     STG 1 pt. to be I with initial HEP to facilitate self management of their condition  -GJ     STG 1 Progress Met  -GJ     STG 2 pt. to be educated in/verbalize understanding of the importance of posture/ergonomics in association with their condition to facilitate self management of their condition  -GJ     STG 2 Progress Met  -GJ     STG 3 pt to report sleeping through the night without interruption secondary to her knee pain to facilate optimal healing  -GJ     STG 3 Progress Met  -GJ     Long Term Goals    LTG Date to Achieve 18  -GJ     LTG 1 pt. to be I with advanced HEP to facilitate self management of their condition  -GJ     LTG 1 Progress Met  -GJ     LTG 2 pt. to report a KOS >/= 87% to demonstrate decreased level of perceived disability  -GJ     LTG 2 Progress Not Met  -GJ     LTG 3 pt to ascend/descend stairs reciprocally without hand rails without exacerbation of her R knee pain to facilitate ease/safety with household/community mobility  -GJ     LTG 3 Progress Not Met  -GJ     LTG 4 pt to report >/= 50% improvement in her R knee pain to facilitate ease/safety with performing household mobility  -GJ     LTG 4 Progress Not Met  -GJ       User Key  (r) = Recorded By, (t) = Taken By, (c) = Cosigned By    Initials Name Provider Type    GUALBERTO Olivares, PT Physical Therapist          OP PT Discharge Summary  Date of Discharge: 18  Reason for Discharge: Independent  Outcomes Achieved: Patient able to partially acheive established goals  Discharge Destination: Home with home program  Discharge Instructions: Ms. Li  attended 9 sessions of physical therapy from 11/2/17-1/25/18 for her R knee pain.  She is independent with her HEP and verbalizes an adequate understanding of her condition.  Ms. Li continued to report elevated pain levels in her knee, essentially unchanged since  initial evaluation.  She demonstrated improving balance/proprioception throughout her RLE and improving R hip strength.  We have decided to allow her to self manage her condition at this time, return to MD when she is ready for further assessment of her unchanging R knee pain.  Ms. Li is discharged from outpatient physical therapy to an independent program.        Time Calculation:                    Luiz Olivares, PT  3/1/2018

## 2018-03-05 ENCOUNTER — LAB (OUTPATIENT)
Dept: LAB | Facility: HOSPITAL | Age: 28
End: 2018-03-05

## 2018-03-05 ENCOUNTER — TRANSCRIBE ORDERS (OUTPATIENT)
Dept: ADMINISTRATIVE | Facility: HOSPITAL | Age: 28
End: 2018-03-05

## 2018-03-05 DIAGNOSIS — Z79.899 NEED FOR PROPHYLACTIC CHEMOTHERAPY: ICD-10-CM

## 2018-03-05 DIAGNOSIS — R63.5 ABNORMAL WEIGHT GAIN: Primary | ICD-10-CM

## 2018-03-05 DIAGNOSIS — R53.83 TIREDNESS: ICD-10-CM

## 2018-03-05 DIAGNOSIS — R63.5 ABNORMAL WEIGHT GAIN: ICD-10-CM

## 2018-03-05 LAB
25(OH)D3 SERPL-MCNC: 25.5 NG/ML (ref 30–100)
ALBUMIN SERPL-MCNC: 4.3 G/DL (ref 3.5–5.2)
ALBUMIN/GLOB SERPL: 1.4 G/DL
ALP SERPL-CCNC: 58 U/L (ref 39–117)
ALT SERPL W P-5'-P-CCNC: 20 U/L (ref 1–33)
ANION GAP SERPL CALCULATED.3IONS-SCNC: 11.5 MMOL/L
AST SERPL-CCNC: 16 U/L (ref 1–32)
BASOPHILS # BLD AUTO: 0.03 10*3/MM3 (ref 0–0.2)
BASOPHILS NFR BLD AUTO: 0.4 % (ref 0–1.5)
BILIRUB SERPL-MCNC: 0.6 MG/DL (ref 0.1–1.2)
BUN BLD-MCNC: 16 MG/DL (ref 6–20)
BUN/CREAT SERPL: 18 (ref 7–25)
CALCIUM SPEC-SCNC: 9.7 MG/DL (ref 8.6–10.5)
CHLORIDE SERPL-SCNC: 104 MMOL/L (ref 98–107)
CO2 SERPL-SCNC: 25.5 MMOL/L (ref 22–29)
CREAT BLD-MCNC: 0.89 MG/DL (ref 0.57–1)
DEPRECATED RDW RBC AUTO: 42.8 FL (ref 37–54)
EOSINOPHIL # BLD AUTO: 0.03 10*3/MM3 (ref 0–0.7)
EOSINOPHIL NFR BLD AUTO: 0.4 % (ref 0.3–6.2)
ERYTHROCYTE [DISTWIDTH] IN BLOOD BY AUTOMATED COUNT: 14.1 % (ref 11.7–13)
GFR SERPL CREATININE-BSD FRML MDRD: 77 ML/MIN/1.73
GLOBULIN UR ELPH-MCNC: 3 GM/DL
GLUCOSE BLD-MCNC: 103 MG/DL (ref 65–99)
HCT VFR BLD AUTO: 46.5 % (ref 35.6–45.5)
HGB BLD-MCNC: 14.6 G/DL (ref 11.9–15.5)
IMM GRANULOCYTES # BLD: 0 10*3/MM3 (ref 0–0.03)
IMM GRANULOCYTES NFR BLD: 0 % (ref 0–0.5)
LYMPHOCYTES # BLD AUTO: 2.39 10*3/MM3 (ref 0.9–4.8)
LYMPHOCYTES NFR BLD AUTO: 29.7 % (ref 19.6–45.3)
MCH RBC QN AUTO: 25.9 PG (ref 26.9–32)
MCHC RBC AUTO-ENTMCNC: 31.4 G/DL (ref 32.4–36.3)
MCV RBC AUTO: 82.4 FL (ref 80.5–98.2)
MONOCYTES # BLD AUTO: 0.4 10*3/MM3 (ref 0.2–1.2)
MONOCYTES NFR BLD AUTO: 5 % (ref 5–12)
NEUTROPHILS # BLD AUTO: 5.21 10*3/MM3 (ref 1.9–8.1)
NEUTROPHILS NFR BLD AUTO: 64.5 % (ref 42.7–76)
PLATELET # BLD AUTO: 234 10*3/MM3 (ref 140–500)
PMV BLD AUTO: 10.8 FL (ref 6–12)
POTASSIUM BLD-SCNC: 4.4 MMOL/L (ref 3.5–5.2)
PROT SERPL-MCNC: 7.3 G/DL (ref 6–8.5)
RBC # BLD AUTO: 5.64 10*6/MM3 (ref 3.9–5.2)
SODIUM BLD-SCNC: 141 MMOL/L (ref 136–145)
T4 FREE SERPL-MCNC: 1.19 NG/DL (ref 0.93–1.7)
TSH SERPL DL<=0.05 MIU/L-ACNC: 0.88 MIU/ML (ref 0.27–4.2)
VIT B12 BLD-MCNC: 311 PG/ML (ref 211–946)
WBC NRBC COR # BLD: 8.06 10*3/MM3 (ref 4.5–10.7)

## 2018-03-05 PROCEDURE — 85025 COMPLETE CBC W/AUTO DIFF WBC: CPT

## 2018-03-05 PROCEDURE — 84439 ASSAY OF FREE THYROXINE: CPT

## 2018-03-05 PROCEDURE — 84443 ASSAY THYROID STIM HORMONE: CPT

## 2018-03-05 PROCEDURE — 80053 COMPREHEN METABOLIC PANEL: CPT

## 2018-03-05 PROCEDURE — 82607 VITAMIN B-12: CPT

## 2018-03-05 PROCEDURE — 36415 COLL VENOUS BLD VENIPUNCTURE: CPT

## 2018-03-05 PROCEDURE — 82306 VITAMIN D 25 HYDROXY: CPT

## 2018-03-14 ENCOUNTER — TRANSCRIBE ORDERS (OUTPATIENT)
Dept: ADMINISTRATIVE | Facility: HOSPITAL | Age: 28
End: 2018-03-14

## 2018-03-14 ENCOUNTER — LAB (OUTPATIENT)
Dept: LAB | Facility: HOSPITAL | Age: 28
End: 2018-03-14

## 2018-03-14 DIAGNOSIS — R53.83 FATIGUE, UNSPECIFIED TYPE: ICD-10-CM

## 2018-03-14 DIAGNOSIS — D51.0 PERNICIOUS ANEMIA: ICD-10-CM

## 2018-03-14 DIAGNOSIS — R53.83 FATIGUE, UNSPECIFIED TYPE: Primary | ICD-10-CM

## 2018-03-14 DIAGNOSIS — D50.9 IRON DEFICIENCY ANEMIA, UNSPECIFIED IRON DEFICIENCY ANEMIA TYPE: ICD-10-CM

## 2018-03-14 LAB
FERRITIN SERPL-MCNC: 53.42 NG/ML (ref 13–150)
FOLATE SERPL-MCNC: 7.65 NG/ML (ref 4.78–24.2)
HGB RETIC QN: 29.8 PG (ref 32.7–38.6)
IMM RETICS NFR: 8.4 % (ref 0.7–13.7)
IRON 24H UR-MRATE: 111 MCG/DL (ref 37–145)
IRON SATN MFR SERPL: 27 % (ref 20–50)
RETICS/RBC NFR AUTO: 1.41 % (ref 0.5–1.5)
TIBC SERPL-MCNC: 411 MCG/DL (ref 298–536)
TRANSFERRIN SERPL-MCNC: 276 MG/DL (ref 200–360)
VIT B12 BLD-MCNC: 490 PG/ML (ref 211–946)

## 2018-03-14 PROCEDURE — 85046 RETICYTE/HGB CONCENTRATE: CPT

## 2018-03-14 PROCEDURE — 84466 ASSAY OF TRANSFERRIN: CPT

## 2018-03-14 PROCEDURE — 83540 ASSAY OF IRON: CPT

## 2018-03-14 PROCEDURE — 82607 VITAMIN B-12: CPT

## 2018-03-14 PROCEDURE — 82728 ASSAY OF FERRITIN: CPT

## 2018-03-14 PROCEDURE — 36415 COLL VENOUS BLD VENIPUNCTURE: CPT

## 2018-03-14 PROCEDURE — 82746 ASSAY OF FOLIC ACID SERUM: CPT

## 2018-05-11 ENCOUNTER — HOSPITAL ENCOUNTER (EMERGENCY)
Facility: HOSPITAL | Age: 28
Discharge: HOME | End: 2018-05-11
Attending: EMERGENCY MEDICINE | Admitting: EMERGENCY MEDICINE

## 2018-05-11 ENCOUNTER — APPOINTMENT (OUTPATIENT)
Dept: GENERAL RADIOLOGY | Facility: HOSPITAL | Age: 28
End: 2018-05-11

## 2018-05-11 ENCOUNTER — APPOINTMENT (OUTPATIENT)
Dept: CT IMAGING | Facility: HOSPITAL | Age: 28
End: 2018-05-11

## 2018-05-11 VITALS
DIASTOLIC BLOOD PRESSURE: 94 MMHG | OXYGEN SATURATION: 95 % | BODY MASS INDEX: 31.83 KG/M2 | WEIGHT: 235 LBS | SYSTOLIC BLOOD PRESSURE: 151 MMHG | RESPIRATION RATE: 18 BRPM | HEIGHT: 72 IN | TEMPERATURE: 98.8 F | HEART RATE: 96 BPM
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
## 2018-05-11 DIAGNOSIS — S16.1XXA STRAIN OF NECK MUSCLE, INITIAL ENCOUNTER: ICD-10-CM

## 2018-05-11 DIAGNOSIS — S06.0X0A CONCUSSION WITHOUT LOSS OF CONSCIOUSNESS, INITIAL ENCOUNTER: Primary | ICD-10-CM

## 2018-05-11 PROCEDURE — 70450 CT HEAD/BRAIN W/O DYE: CPT

## 2018-05-11 PROCEDURE — 99283 EMERGENCY DEPT VISIT LOW MDM: CPT

## 2018-05-11 PROCEDURE — 72050 X-RAY EXAM NECK SPINE 4/5VWS: CPT

## 2018-05-11 RX ORDER — IBUPROFEN 600 MG/1
600 TABLET ORAL EVERY 8 HOURS PRN
Qty: 21 TABLET | Refills: 0 | Status: SHIPPED | OUTPATIENT
Start: 2018-05-11 | End: 2020-12-09

## 2018-05-11 RX ORDER — BUSPIRONE HYDROCHLORIDE 10 MG/1
10 TABLET ORAL 2 TIMES DAILY
COMMUNITY
End: 2020-12-09

## 2018-05-11 RX ORDER — CYCLOBENZAPRINE HCL 10 MG
10 TABLET ORAL 3 TIMES DAILY PRN
Qty: 21 TABLET | Refills: 0 | Status: SHIPPED | OUTPATIENT
Start: 2018-05-11 | End: 2020-12-09
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
## 2018-05-11 NOTE — DISCHARGE INSTRUCTIONS
Use ice to your shoulders and use medications as directed.  The Flexeril may make you sleepy.  Avoid computers and tv over the weekend.  Please return to the ED if symptoms worsen with vomiting or increasing pain.
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
## 2020-12-07 DIAGNOSIS — K64.9 HEMORRHOIDS, UNSPECIFIED HEMORRHOID TYPE: Primary | ICD-10-CM

## 2020-12-08 ENCOUNTER — LAB (OUTPATIENT)
Dept: LAB | Facility: HOSPITAL | Age: 30
End: 2020-12-08

## 2020-12-08 DIAGNOSIS — K64.9 HEMORRHOIDS, UNSPECIFIED HEMORRHOID TYPE: ICD-10-CM

## 2020-12-08 PROCEDURE — C9803 HOPD COVID-19 SPEC COLLECT: HCPCS

## 2020-12-08 PROCEDURE — U0004 COV-19 TEST NON-CDC HGH THRU: HCPCS

## 2020-12-08 NOTE — H&P (VIEW-ONLY)
"CC: Thrombosed hemorrhoid    HPI    Patient is a 30 y.o. female who is her  at the [employer] OR at [employer], who comes in with a thrombosed hemorrhoid.  This developed about 6 days ago, was at its worse about 2 days ago, but still is associated with considerable pain and itching.  She is not had any rectal bleeding.    She denies any constipation or straining associated with the development of this.  On the other hand she does have lots of meetings where she is sitting and has a long commute to work.  She has no family history of colon cancer.    Past Medical History:   Diagnosis Date   • Anxiety    • Depression    • Migraine    • Migraines      Past Surgical History:   Procedure Laterality Date   • WISDOM TOOTH EXTRACTION       Family History   Problem Relation Age of Onset   • Breast cancer Mother    • Leukemia Father         CLL     Social History     Tobacco Use   • Smoking status: Never Smoker   • Smokeless tobacco: Never Used   Substance Use Topics   • Alcohol use: Not Currently     Comment: social   • Drug use: No       Occupation/Additional Social Hx:  [employer]      Current Outpatient Medications:   •  busPIRone (BUSPAR) 15 MG tablet, Take 15 mg by mouth 3 (Three) Times a Day As Needed., Disp: , Rfl:   •  escitalopram (LEXAPRO) 20 MG tablet, Take 20 mg by mouth Daily., Disp: , Rfl:   •  medroxyPROGESTERone Acetate (DEPO-PROVERA IM), Inject  into the appropriate muscle as directed by prescriber Every 3 (Three) Months., Disp: , Rfl:     No Known Allergies    Preventative Medicine  Colonoscopy: Not applicable    Review of Systems  Positive for vaginal discharge  Vitals:    12/09/20 1321   Weight: 113 kg (250 lb)   Height: 182.9 cm (72\")     Body mass index is 33.91 kg/m².    Physical Exam  Vitals signs reviewed.   Constitutional:       Appearance: Normal appearance.   HENT:      Nose: Nose normal.   Eyes:      General: No scleral icterus.        Right eye: No discharge.         Left " eye: No discharge.   Cardiovascular:      Rate and Rhythm: Normal rate.   Pulmonary:      Effort: Pulmonary effort is normal.      Breath sounds: No stridor.   Abdominal:      General: Abdomen is flat.      Palpations: Abdomen is soft.      Hernia: No hernia is present.   Genitourinary:     Comments: Thrombosed external hemorrhoid on the left anteriorly, about 1.5 cm, tender, with no obvious extension into the internal canal  Skin:     General: Skin is warm.   Neurological:      General: No focal deficit present.      Mental Status: She is alert and oriented to person, place, and time.   Psychiatric:         Mood and Affect: Mood normal.         Behavior: Behavior normal.         Thought Content: Thought content normal.         Judgment: Judgment normal.     Labs; Covid negative    IMPRESSION:  · External thrombosed hemorrhoid    PLAN:  · Excision under MAC anesthesia, unless preferred differently by anesthesia.  I think this is the best way for her to get rid of the problem and not have ongoing issues with external tags.  · We discussed return to work issues.  · Do so in left lateral decubitus position

## 2020-12-08 NOTE — PROGRESS NOTES
"CC: Thrombosed hemorrhoid    HPI    Patient is a 30 y.o. female who is her  at the [employer] OR at [employer] [facility], who comes in with a thrombosed hemorrhoid.  This developed about 6 days ago, was at its worse about 2 days ago, but still is associated with considerable pain and itching.  She is not had any rectal bleeding.    She denies any constipation or straining associated with the development of this.  On the other hand she does have lots of meetings where she is sitting and has a long commute to work.  She has no family history of colon cancer.    Past Medical History:   Diagnosis Date   • Anxiety    • Depression    • Migraine    • Migraines      Past Surgical History:   Procedure Laterality Date   • WISDOM TOOTH EXTRACTION       Family History   Problem Relation Age of Onset   • Breast cancer Mother    • Leukemia Father         CLL     Social History     Tobacco Use   • Smoking status: Never Smoker   • Smokeless tobacco: Never Used   Substance Use Topics   • Alcohol use: Not Currently     Comment: social   • Drug use: No       Occupation/Additional Social Hx:  [employer]      Current Outpatient Medications:   •  busPIRone (BUSPAR) 15 MG tablet, Take 15 mg by mouth 3 (Three) Times a Day As Needed., Disp: , Rfl:   •  escitalopram (LEXAPRO) 20 MG tablet, Take 20 mg by mouth Daily., Disp: , Rfl:   •  medroxyPROGESTERone Acetate (DEPO-PROVERA IM), Inject  into the appropriate muscle as directed by prescriber Every 3 (Three) Months., Disp: , Rfl:     No Known Allergies    Preventative Medicine  Colonoscopy: Not applicable    Review of Systems  Positive for vaginal discharge  Vitals:    12/09/20 1321   Weight: 113 kg (250 lb)   Height: 182.9 cm (72\")     Body mass index is 33.91 kg/m².    Physical Exam  Vitals signs reviewed.   Constitutional:       Appearance: Normal appearance.   HENT:      Nose: Nose normal.   Eyes:      General: No scleral icterus.        Right eye: No discharge.         Left " eye: No discharge.   Cardiovascular:      Rate and Rhythm: Normal rate.   Pulmonary:      Effort: Pulmonary effort is normal.      Breath sounds: No stridor.   Abdominal:      General: Abdomen is flat.      Palpations: Abdomen is soft.      Hernia: No hernia is present.   Genitourinary:     Comments: Thrombosed external hemorrhoid on the left anteriorly, about 1.5 cm, tender, with no obvious extension into the internal canal  Skin:     General: Skin is warm.   Neurological:      General: No focal deficit present.      Mental Status: She is alert and oriented to person, place, and time.   Psychiatric:         Mood and Affect: Mood normal.         Behavior: Behavior normal.         Thought Content: Thought content normal.         Judgment: Judgment normal.     Labs; Covid negative    IMPRESSION:  · External thrombosed hemorrhoid    PLAN:  · Excision under MAC anesthesia, unless preferred differently by anesthesia.  I think this is the best way for her to get rid of the problem and not have ongoing issues with external tags.  · We discussed return to work issues.  · Do so in left lateral decubitus position

## 2020-12-09 ENCOUNTER — PREP FOR SURGERY (OUTPATIENT)
Dept: OTHER | Facility: HOSPITAL | Age: 30
End: 2020-12-09

## 2020-12-09 ENCOUNTER — OFFICE VISIT (OUTPATIENT)
Dept: SURGERY | Facility: CLINIC | Age: 30
End: 2020-12-09

## 2020-12-09 VITALS — HEIGHT: 72 IN | BODY MASS INDEX: 33.86 KG/M2 | WEIGHT: 250 LBS

## 2020-12-09 DIAGNOSIS — K64.9 HEMORRHOIDS, UNSPECIFIED HEMORRHOID TYPE: Primary | ICD-10-CM

## 2020-12-09 LAB — SARS-COV-2 RNA RESP QL NAA+PROBE: NOT DETECTED

## 2020-12-09 PROCEDURE — 99203 OFFICE O/P NEW LOW 30 MIN: CPT | Performed by: SURGERY

## 2020-12-09 RX ORDER — CEFAZOLIN SODIUM 2 G/100ML
2 INJECTION, SOLUTION INTRAVENOUS ONCE
Status: CANCELLED | OUTPATIENT
Start: 2020-12-10 | End: 2020-12-09

## 2020-12-09 RX ORDER — ACETAMINOPHEN 500 MG
1000 TABLET ORAL ONCE
Status: CANCELLED | OUTPATIENT
Start: 2020-12-10 | End: 2020-12-09

## 2020-12-09 RX ORDER — OXYCODONE HCL 10 MG/1
10 TABLET, FILM COATED, EXTENDED RELEASE ORAL ONCE
Status: CANCELLED | OUTPATIENT
Start: 2020-12-10 | End: 2020-12-09

## 2020-12-09 RX ORDER — SODIUM CHLORIDE 0.9 % (FLUSH) 0.9 %
1-10 SYRINGE (ML) INJECTION AS NEEDED
Status: CANCELLED | OUTPATIENT
Start: 2020-12-10

## 2020-12-09 RX ORDER — BUSPIRONE HYDROCHLORIDE 15 MG/1
15 TABLET ORAL 3 TIMES DAILY PRN
COMMUNITY
End: 2023-01-06

## 2020-12-09 RX ORDER — SODIUM CHLORIDE 0.9 % (FLUSH) 0.9 %
3 SYRINGE (ML) INJECTION EVERY 12 HOURS SCHEDULED
Status: CANCELLED | OUTPATIENT
Start: 2020-12-10

## 2020-12-09 RX ORDER — ESCITALOPRAM OXALATE 20 MG/1
20 TABLET ORAL DAILY
COMMUNITY
End: 2023-01-06

## 2020-12-10 ENCOUNTER — ANESTHESIA (OUTPATIENT)
Dept: PERIOP | Facility: HOSPITAL | Age: 30
End: 2020-12-10

## 2020-12-10 ENCOUNTER — ANESTHESIA EVENT (OUTPATIENT)
Dept: PERIOP | Facility: HOSPITAL | Age: 30
End: 2020-12-10

## 2020-12-10 ENCOUNTER — HOSPITAL ENCOUNTER (OUTPATIENT)
Facility: HOSPITAL | Age: 30
Setting detail: HOSPITAL OUTPATIENT SURGERY
Discharge: HOME | End: 2020-12-10
Attending: SURGERY | Admitting: SURGERY

## 2020-12-10 VITALS
DIASTOLIC BLOOD PRESSURE: 77 MMHG | TEMPERATURE: 97.8 F | RESPIRATION RATE: 16 BRPM | HEART RATE: 79 BPM | SYSTOLIC BLOOD PRESSURE: 114 MMHG | OXYGEN SATURATION: 100 %

## 2020-12-10 DIAGNOSIS — K64.9 HEMORRHOIDS, UNSPECIFIED HEMORRHOID TYPE: ICD-10-CM

## 2020-12-10 DIAGNOSIS — K64.5 PERIANAL VENOUS THROMBOSIS: Primary | ICD-10-CM

## 2020-12-10 LAB
B-HCG UR QL: NEGATIVE
INTERNAL NEGATIVE CONTROL: NEGATIVE
INTERNAL POSITIVE CONTROL: POSITIVE
Lab: NORMAL

## 2020-12-10 PROCEDURE — 25010000002 ONDANSETRON PER 1 MG: Performed by: ANESTHESIOLOGY

## 2020-12-10 PROCEDURE — 46320 REMOVAL OF HEMORRHOID CLOT: CPT | Performed by: SURGERY

## 2020-12-10 PROCEDURE — 81025 URINE PREGNANCY TEST: CPT | Performed by: ANESTHESIOLOGY

## 2020-12-10 PROCEDURE — 25010000003 CEFAZOLIN IN DEXTROSE 2-4 GM/100ML-% SOLUTION: Performed by: SURGERY

## 2020-12-10 PROCEDURE — 25010000002 FENTANYL CITRATE (PF) 100 MCG/2ML SOLUTION: Performed by: ANESTHESIOLOGY

## 2020-12-10 PROCEDURE — 25010000002 MIDAZOLAM PER 1 MG: Performed by: ANESTHESIOLOGY

## 2020-12-10 PROCEDURE — 25010000002 PROPOFOL 10 MG/ML EMULSION: Performed by: NURSE ANESTHETIST, CERTIFIED REGISTERED

## 2020-12-10 PROCEDURE — 88304 TISSUE EXAM BY PATHOLOGIST: CPT | Performed by: SURGERY

## 2020-12-10 RX ORDER — LIDOCAINE HYDROCHLORIDE 10 MG/ML
0.5 INJECTION, SOLUTION EPIDURAL; INFILTRATION; INTRACAUDAL; PERINEURAL ONCE AS NEEDED
Status: DISCONTINUED | OUTPATIENT
Start: 2020-12-10 | End: 2020-12-10 | Stop reason: HOSPADM

## 2020-12-10 RX ORDER — OXYCODONE HCL 10 MG/1
10 TABLET, FILM COATED, EXTENDED RELEASE ORAL ONCE
Status: COMPLETED | OUTPATIENT
Start: 2020-12-10 | End: 2020-12-10

## 2020-12-10 RX ORDER — DIPHENHYDRAMINE HYDROCHLORIDE 50 MG/ML
12.5 INJECTION INTRAMUSCULAR; INTRAVENOUS
Status: CANCELLED | OUTPATIENT
Start: 2020-12-10

## 2020-12-10 RX ORDER — PROMETHAZINE HYDROCHLORIDE 25 MG/1
25 SUPPOSITORY RECTAL ONCE AS NEEDED
Status: CANCELLED | OUTPATIENT
Start: 2020-12-10

## 2020-12-10 RX ORDER — FENTANYL CITRATE 50 UG/ML
50 INJECTION, SOLUTION INTRAMUSCULAR; INTRAVENOUS
Status: DISCONTINUED | OUTPATIENT
Start: 2020-12-10 | End: 2020-12-10 | Stop reason: HOSPADM

## 2020-12-10 RX ORDER — FLUMAZENIL 0.1 MG/ML
0.2 INJECTION INTRAVENOUS AS NEEDED
Status: CANCELLED | OUTPATIENT
Start: 2020-12-10

## 2020-12-10 RX ORDER — SODIUM CHLORIDE, SODIUM LACTATE, POTASSIUM CHLORIDE, CALCIUM CHLORIDE 600; 310; 30; 20 MG/100ML; MG/100ML; MG/100ML; MG/100ML
9 INJECTION, SOLUTION INTRAVENOUS CONTINUOUS
Status: DISCONTINUED | OUTPATIENT
Start: 2020-12-10 | End: 2020-12-10 | Stop reason: HOSPADM

## 2020-12-10 RX ORDER — DIPHENHYDRAMINE HCL 25 MG
25 CAPSULE ORAL
Status: CANCELLED | OUTPATIENT
Start: 2020-12-10

## 2020-12-10 RX ORDER — SODIUM CHLORIDE 0.9 % (FLUSH) 0.9 %
1-10 SYRINGE (ML) INJECTION AS NEEDED
Status: DISCONTINUED | OUTPATIENT
Start: 2020-12-10 | End: 2020-12-10 | Stop reason: HOSPADM

## 2020-12-10 RX ORDER — HYDROCODONE BITARTRATE AND ACETAMINOPHEN 7.5; 325 MG/1; MG/1
1 TABLET ORAL ONCE AS NEEDED
Status: CANCELLED | OUTPATIENT
Start: 2020-12-10

## 2020-12-10 RX ORDER — SODIUM CHLORIDE 0.9 % (FLUSH) 0.9 %
3-10 SYRINGE (ML) INJECTION AS NEEDED
Status: DISCONTINUED | OUTPATIENT
Start: 2020-12-10 | End: 2020-12-10 | Stop reason: HOSPADM

## 2020-12-10 RX ORDER — GINSENG 100 MG
CAPSULE ORAL AS NEEDED
Status: DISCONTINUED | OUTPATIENT
Start: 2020-12-10 | End: 2020-12-10 | Stop reason: HOSPADM

## 2020-12-10 RX ORDER — OXYCODONE AND ACETAMINOPHEN 7.5; 325 MG/1; MG/1
1 TABLET ORAL ONCE AS NEEDED
Status: CANCELLED | OUTPATIENT
Start: 2020-12-10

## 2020-12-10 RX ORDER — FENTANYL CITRATE 50 UG/ML
50 INJECTION, SOLUTION INTRAMUSCULAR; INTRAVENOUS
Status: CANCELLED | OUTPATIENT
Start: 2020-12-10

## 2020-12-10 RX ORDER — HYDROMORPHONE HYDROCHLORIDE 1 MG/ML
0.5 INJECTION, SOLUTION INTRAMUSCULAR; INTRAVENOUS; SUBCUTANEOUS
Status: CANCELLED | OUTPATIENT
Start: 2020-12-10

## 2020-12-10 RX ORDER — FAMOTIDINE 10 MG/ML
20 INJECTION, SOLUTION INTRAVENOUS ONCE
Status: COMPLETED | OUTPATIENT
Start: 2020-12-10 | End: 2020-12-10

## 2020-12-10 RX ORDER — CEFAZOLIN SODIUM 2 G/100ML
2 INJECTION, SOLUTION INTRAVENOUS ONCE
Status: COMPLETED | OUTPATIENT
Start: 2020-12-10 | End: 2020-12-10

## 2020-12-10 RX ORDER — SODIUM CHLORIDE 0.9 % (FLUSH) 0.9 %
3 SYRINGE (ML) INJECTION EVERY 12 HOURS SCHEDULED
Status: DISCONTINUED | OUTPATIENT
Start: 2020-12-10 | End: 2020-12-10 | Stop reason: HOSPADM

## 2020-12-10 RX ORDER — PROMETHAZINE HYDROCHLORIDE 25 MG/1
25 TABLET ORAL EVERY 6 HOURS PRN
Qty: 30 TABLET | Refills: 0 | Status: SHIPPED | OUTPATIENT
Start: 2020-12-10 | End: 2021-01-19

## 2020-12-10 RX ORDER — EPHEDRINE SULFATE 50 MG/ML
5 INJECTION, SOLUTION INTRAVENOUS ONCE AS NEEDED
Status: CANCELLED | OUTPATIENT
Start: 2020-12-10

## 2020-12-10 RX ORDER — PROPOFOL 10 MG/ML
VIAL (ML) INTRAVENOUS CONTINUOUS PRN
Status: DISCONTINUED | OUTPATIENT
Start: 2020-12-10 | End: 2020-12-10 | Stop reason: SURG

## 2020-12-10 RX ORDER — PROMETHAZINE HYDROCHLORIDE 25 MG/1
25 TABLET ORAL ONCE AS NEEDED
Status: CANCELLED | OUTPATIENT
Start: 2020-12-10

## 2020-12-10 RX ORDER — PROPOFOL 10 MG/ML
VIAL (ML) INTRAVENOUS AS NEEDED
Status: DISCONTINUED | OUTPATIENT
Start: 2020-12-10 | End: 2020-12-10 | Stop reason: SURG

## 2020-12-10 RX ORDER — BUPIVACAINE HYDROCHLORIDE AND EPINEPHRINE 5; 5 MG/ML; UG/ML
INJECTION, SOLUTION PERINEURAL AS NEEDED
Status: DISCONTINUED | OUTPATIENT
Start: 2020-12-10 | End: 2020-12-10 | Stop reason: HOSPADM

## 2020-12-10 RX ORDER — NALOXONE HCL 0.4 MG/ML
0.2 VIAL (ML) INJECTION AS NEEDED
Status: CANCELLED | OUTPATIENT
Start: 2020-12-10

## 2020-12-10 RX ORDER — GLYCOPYRROLATE 0.2 MG/ML
INJECTION INTRAMUSCULAR; INTRAVENOUS AS NEEDED
Status: DISCONTINUED | OUTPATIENT
Start: 2020-12-10 | End: 2020-12-10 | Stop reason: SURG

## 2020-12-10 RX ORDER — MIDAZOLAM HYDROCHLORIDE 1 MG/ML
1 INJECTION INTRAMUSCULAR; INTRAVENOUS
Status: COMPLETED | OUTPATIENT
Start: 2020-12-10 | End: 2020-12-10

## 2020-12-10 RX ORDER — ACETAMINOPHEN 500 MG
1000 TABLET ORAL ONCE
Status: COMPLETED | OUTPATIENT
Start: 2020-12-10 | End: 2020-12-10

## 2020-12-10 RX ORDER — ONDANSETRON 2 MG/ML
4 INJECTION INTRAMUSCULAR; INTRAVENOUS ONCE AS NEEDED
Status: COMPLETED | OUTPATIENT
Start: 2020-12-10 | End: 2020-12-10

## 2020-12-10 RX ADMIN — MIDAZOLAM 1 MG: 1 INJECTION INTRAMUSCULAR; INTRAVENOUS at 08:02

## 2020-12-10 RX ADMIN — PROPOFOL 200 MCG/KG/MIN: 10 INJECTION, EMULSION INTRAVENOUS at 09:10

## 2020-12-10 RX ADMIN — PROPOFOL 50 MG: 10 INJECTION, EMULSION INTRAVENOUS at 09:13

## 2020-12-10 RX ADMIN — MIDAZOLAM 1 MG: 1 INJECTION INTRAMUSCULAR; INTRAVENOUS at 08:25

## 2020-12-10 RX ADMIN — ACETAMINOPHEN 1000 MG: 500 TABLET ORAL at 08:02

## 2020-12-10 RX ADMIN — FAMOTIDINE 20 MG: 10 INJECTION INTRAVENOUS at 08:02

## 2020-12-10 RX ADMIN — CEFAZOLIN SODIUM 2 G: 2 INJECTION, SOLUTION INTRAVENOUS at 09:15

## 2020-12-10 RX ADMIN — FENTANYL CITRATE 50 MCG: 50 INJECTION INTRAMUSCULAR; INTRAVENOUS at 09:40

## 2020-12-10 RX ADMIN — FENTANYL CITRATE 25 MCG: 50 INJECTION INTRAMUSCULAR; INTRAVENOUS at 09:11

## 2020-12-10 RX ADMIN — OXYCODONE HYDROCHLORIDE 10 MG: 10 TABLET, FILM COATED, EXTENDED RELEASE ORAL at 08:02

## 2020-12-10 RX ADMIN — SODIUM CHLORIDE, POTASSIUM CHLORIDE, SODIUM LACTATE AND CALCIUM CHLORIDE 9 ML/HR: 600; 310; 30; 20 INJECTION, SOLUTION INTRAVENOUS at 08:02

## 2020-12-10 RX ADMIN — FENTANYL CITRATE 25 MCG: 50 INJECTION INTRAMUSCULAR; INTRAVENOUS at 09:17

## 2020-12-10 RX ADMIN — PROPOFOL 50 MG: 10 INJECTION, EMULSION INTRAVENOUS at 09:11

## 2020-12-10 RX ADMIN — ONDANSETRON 4 MG: 2 INJECTION INTRAMUSCULAR; INTRAVENOUS at 10:25

## 2020-12-10 RX ADMIN — GLYCOPYRROLATE 0.2 MG: 0.2 INJECTION INTRAMUSCULAR; INTRAVENOUS at 09:15

## 2020-12-10 NOTE — ANESTHESIA POSTPROCEDURE EVALUATION
Patient: Linda Li    Procedure Summary     Date: 12/10/20 Room / Location:  [facility]  /  [facility]    Anesthesia Start: 0905 Anesthesia Stop: 0956    Procedure: excision of thrombosed hemorrhoid (N/A Anus) Diagnosis:       Hemorrhoids, unspecified hemorrhoid type      (Hemorrhoids, unspecified hemorrhoid type [K64.9])    Surgeon: Geri Anglin MD Provider: Rod Aranda DO    Anesthesia Type: MAC ASA Status: 2          Anesthesia Type: MAC    Vitals  Vitals Value Taken Time   /77 12/10/20 1014   Temp 36.6 °C (97.8 °F) 12/10/20 0955   Pulse 79 12/10/20 1014   Resp 16 12/10/20 1014   SpO2 100 % 12/10/20 1014           Post Anesthesia Care and Evaluation    Patient location during evaluation: bedside  Patient participation: complete - patient participated  Level of consciousness: awake and alert  Pain management: satisfactory to patient  Airway patency: patent  Anesthetic complications: No anesthetic complications  PONV Status: controlled  Cardiovascular status: acceptable and hemodynamically stable  Respiratory status: acceptable  Hydration status: acceptable    Comments: /77   Pulse 79   Temp 36.6 °C (97.8 °F) (Oral)   Resp 16   SpO2 100%

## 2020-12-10 NOTE — OP NOTE
SURGERY  Operative Note :  DERREK Li  1990    Procedure Date: 12/10/20    Pre-op Diagnosis:  · hemorrhoids    Post-op Diagnosis:  · Large left posterior hemorrhoid  · Small right posterior hemorrhoid    Procedure:   · Excision thrombosed hemorrhoids right and left.    Surgeon: Derrek    Assistant: manjinder    Indications:  · Nice 30-year-old  Iam who comes in with thrombosed hemorrhoids with a long drive to and from work    Findings:   · Large left posterior external thrombosed hemorrhoid  · Smaller right posterior external thrombosed hemorrhoid    Recommendations:   · Avoid driving to work until 1 week from now due to the lengthy drive.  · Avoid sitting as much as possible with laying on your side or walking being preferable  · Avoid difficulty with bowel movements, thus avoiding both diarrhea and constipation, by using stool softener and MiraLAX as needed  · Follow-up office visit for wound examination if desired should be at about 10 days postop    Specimens:   · Hemorrhoid    EBL: 25 cc    Technique:     (Decubitus position and IV anesthetic was induced but she had difficulty with her antecubital IV and we had to stop and place a new IV.    The buttocks area had been prepped and draped with tape to keep it open.  I anesthetized with half percent Marcaine with epinephrine and excised to elliptical areas 1 in the left posterior one in the right posterior of the overlying skin and underlying thrombosed tissue.  This was dissected down to the anal verge and then some clots that extended into the anal verge were pulled out.  Both were then closed with running 3 oh locking chromic.  Antibiotic ointment and dressings.    Geri Anglin MD  12/10/20  09:56 EST

## 2020-12-10 NOTE — ANESTHESIA PREPROCEDURE EVALUATION
Anesthesia Evaluation     Patient summary reviewed and Nursing notes reviewed   NPO Solid Status: > 8 hours  NPO Liquid Status: > 2 hours           Airway   Mallampati: I  TM distance: >3 FB  Neck ROM: full  No difficulty expected  Dental - normal exam     Pulmonary     breath sounds clear to auscultation  (-) shortness of breath, recent URI  Cardiovascular   Exercise tolerance: good (4-7 METS)    Rhythm: regular  Rate: normal    (-) past MI, CAD, angina, MUÑOZ      Neuro/Psych  (+) headaches, psychiatric history Anxiety and Depression,     (-) seizures, neuromuscular disease, TIA, CVA  GI/Hepatic/Renal/Endo    (+) obesity,     (-) no renal disease, diabetes    Musculoskeletal     Abdominal    Substance History      OB/GYN          Other                        Anesthesia Plan    ASA 2     MAC   (Discussed positioning with surgeon, MAC should be appropriate. Pt aware GA possible if needed for her safety.)    Anesthetic plan, all risks, benefits, and alternatives have been provided, discussed and informed consent has been obtained with: patient.    Plan discussed with CRNA.

## 2020-12-11 LAB
LAB AP CASE REPORT: NORMAL
PATH REPORT.FINAL DX SPEC: NORMAL
PATH REPORT.GROSS SPEC: NORMAL

## 2020-12-23 ENCOUNTER — IMMUNIZATION (OUTPATIENT)
Dept: VACCINE CLINIC | Facility: HOSPITAL | Age: 30
End: 2020-12-23

## 2020-12-23 PROCEDURE — 91300 HC SARSCOV02 VAC 30MCG/0.3ML IM: CPT | Performed by: INTERNAL MEDICINE

## 2020-12-23 PROCEDURE — 0001A: CPT | Performed by: INTERNAL MEDICINE

## 2021-01-13 ENCOUNTER — IMMUNIZATION (OUTPATIENT)
Dept: VACCINE CLINIC | Facility: HOSPITAL | Age: 31
End: 2021-01-13

## 2021-01-13 PROCEDURE — 0001A: CPT | Performed by: INTERNAL MEDICINE

## 2021-01-13 PROCEDURE — 0002A: CPT | Performed by: INTERNAL MEDICINE

## 2021-01-13 PROCEDURE — 91300 HC SARSCOV02 VAC 30MCG/0.3ML IM: CPT | Performed by: INTERNAL MEDICINE

## 2021-01-18 ENCOUNTER — APPOINTMENT (OUTPATIENT)
Dept: GENERAL RADIOLOGY | Facility: HOSPITAL | Age: 31
End: 2021-01-18

## 2021-01-18 ENCOUNTER — APPOINTMENT (OUTPATIENT)
Dept: CT IMAGING | Facility: HOSPITAL | Age: 31
End: 2021-01-18

## 2021-01-18 ENCOUNTER — HOSPITAL ENCOUNTER (EMERGENCY)
Facility: HOSPITAL | Age: 31
Discharge: HOME | End: 2021-01-18
Attending: EMERGENCY MEDICINE | Admitting: EMERGENCY MEDICINE

## 2021-01-18 VITALS
SYSTOLIC BLOOD PRESSURE: 135 MMHG | TEMPERATURE: 98.2 F | HEART RATE: 100 BPM | OXYGEN SATURATION: 100 % | DIASTOLIC BLOOD PRESSURE: 91 MMHG | RESPIRATION RATE: 16 BRPM

## 2021-01-18 DIAGNOSIS — R06.00 DYSPNEA, UNSPECIFIED TYPE: Primary | ICD-10-CM

## 2021-01-18 DIAGNOSIS — I10 ESSENTIAL HYPERTENSION: ICD-10-CM

## 2021-01-18 DIAGNOSIS — R00.0 TACHYCARDIA: ICD-10-CM

## 2021-01-18 LAB
ALBUMIN SERPL-MCNC: 4.5 G/DL (ref 3.5–5.2)
ALBUMIN/GLOB SERPL: 1.6 G/DL
ALP SERPL-CCNC: 75 U/L (ref 39–117)
ALT SERPL W P-5'-P-CCNC: 24 U/L (ref 1–33)
ANION GAP SERPL CALCULATED.3IONS-SCNC: 8.4 MMOL/L (ref 5–15)
AST SERPL-CCNC: 14 U/L (ref 1–32)
BASOPHILS # BLD AUTO: 0.05 10*3/MM3 (ref 0–0.2)
BASOPHILS NFR BLD AUTO: 0.8 % (ref 0–1.5)
BILIRUB SERPL-MCNC: 0.4 MG/DL (ref 0–1.2)
BUN SERPL-MCNC: 18 MG/DL (ref 6–20)
BUN/CREAT SERPL: 18.8 (ref 7–25)
CALCIUM SPEC-SCNC: 9.3 MG/DL (ref 8.6–10.5)
CHLORIDE SERPL-SCNC: 107 MMOL/L (ref 98–107)
CO2 SERPL-SCNC: 23.6 MMOL/L (ref 22–29)
CREAT SERPL-MCNC: 0.96 MG/DL (ref 0.57–1)
D DIMER PPP FEU-MCNC: 0.71 MCGFEU/ML (ref 0–0.49)
DEPRECATED RDW RBC AUTO: 36.7 FL (ref 37–54)
EOSINOPHIL # BLD AUTO: 0.07 10*3/MM3 (ref 0–0.4)
EOSINOPHIL NFR BLD AUTO: 1.1 % (ref 0.3–6.2)
ERYTHROCYTE [DISTWIDTH] IN BLOOD BY AUTOMATED COUNT: 13.1 % (ref 12.3–15.4)
GFR SERPL CREATININE-BSD FRML MDRD: 69 ML/MIN/1.73
GLOBULIN UR ELPH-MCNC: 2.9 GM/DL
GLUCOSE SERPL-MCNC: 97 MG/DL (ref 65–99)
HCT VFR BLD AUTO: 44.1 % (ref 34–46.6)
HGB BLD-MCNC: 14.7 G/DL (ref 12–15.9)
HOLD SPECIMEN: NORMAL
HOLD SPECIMEN: NORMAL
IMM GRANULOCYTES # BLD AUTO: 0.01 10*3/MM3 (ref 0–0.05)
IMM GRANULOCYTES NFR BLD AUTO: 0.2 % (ref 0–0.5)
INR PPP: 0.96 (ref 0.9–1.1)
LYMPHOCYTES # BLD AUTO: 2.41 10*3/MM3 (ref 0.7–3.1)
LYMPHOCYTES NFR BLD AUTO: 37.6 % (ref 19.6–45.3)
MAGNESIUM SERPL-MCNC: 1.9 MG/DL (ref 1.6–2.6)
MCH RBC QN AUTO: 25.9 PG (ref 26.6–33)
MCHC RBC AUTO-ENTMCNC: 33.3 G/DL (ref 31.5–35.7)
MCV RBC AUTO: 77.6 FL (ref 79–97)
MONOCYTES # BLD AUTO: 0.35 10*3/MM3 (ref 0.1–0.9)
MONOCYTES NFR BLD AUTO: 5.5 % (ref 5–12)
NEUTROPHILS NFR BLD AUTO: 3.52 10*3/MM3 (ref 1.7–7)
NEUTROPHILS NFR BLD AUTO: 54.8 % (ref 42.7–76)
NRBC BLD AUTO-RTO: 0 /100 WBC (ref 0–0.2)
NT-PROBNP SERPL-MCNC: 18.3 PG/ML (ref 0–450)
PLATELET # BLD AUTO: 256 10*3/MM3 (ref 140–450)
PMV BLD AUTO: 10.4 FL (ref 6–12)
POTASSIUM SERPL-SCNC: 4 MMOL/L (ref 3.5–5.2)
PROT SERPL-MCNC: 7.4 G/DL (ref 6–8.5)
PROTHROMBIN TIME: 12.6 SECONDS (ref 11.7–14.2)
QT INTERVAL: 343 MS
RBC # BLD AUTO: 5.68 10*6/MM3 (ref 3.77–5.28)
SODIUM SERPL-SCNC: 139 MMOL/L (ref 136–145)
TROPONIN T SERPL-MCNC: <0.01 NG/ML (ref 0–0.03)
TSH SERPL DL<=0.05 MIU/L-ACNC: 1.25 UIU/ML (ref 0.27–4.2)
WBC # BLD AUTO: 6.41 10*3/MM3 (ref 3.4–10.8)
WHOLE BLOOD HOLD SPECIMEN: NORMAL
WHOLE BLOOD HOLD SPECIMEN: NORMAL

## 2021-01-18 PROCEDURE — 93005 ELECTROCARDIOGRAM TRACING: CPT | Performed by: EMERGENCY MEDICINE

## 2021-01-18 PROCEDURE — 99284 EMERGENCY DEPT VISIT MOD MDM: CPT

## 2021-01-18 PROCEDURE — 85025 COMPLETE CBC W/AUTO DIFF WBC: CPT | Performed by: EMERGENCY MEDICINE

## 2021-01-18 PROCEDURE — 85610 PROTHROMBIN TIME: CPT | Performed by: EMERGENCY MEDICINE

## 2021-01-18 PROCEDURE — 93005 ELECTROCARDIOGRAM TRACING: CPT

## 2021-01-18 PROCEDURE — 84443 ASSAY THYROID STIM HORMONE: CPT | Performed by: EMERGENCY MEDICINE

## 2021-01-18 PROCEDURE — 93010 ELECTROCARDIOGRAM REPORT: CPT | Performed by: INTERNAL MEDICINE

## 2021-01-18 PROCEDURE — 83735 ASSAY OF MAGNESIUM: CPT | Performed by: EMERGENCY MEDICINE

## 2021-01-18 PROCEDURE — 80053 COMPREHEN METABOLIC PANEL: CPT | Performed by: EMERGENCY MEDICINE

## 2021-01-18 PROCEDURE — 71045 X-RAY EXAM CHEST 1 VIEW: CPT

## 2021-01-18 PROCEDURE — 0 IOPAMIDOL PER 1 ML: Performed by: EMERGENCY MEDICINE

## 2021-01-18 PROCEDURE — 85379 FIBRIN DEGRADATION QUANT: CPT | Performed by: EMERGENCY MEDICINE

## 2021-01-18 PROCEDURE — 71275 CT ANGIOGRAPHY CHEST: CPT

## 2021-01-18 PROCEDURE — 84484 ASSAY OF TROPONIN QUANT: CPT | Performed by: EMERGENCY MEDICINE

## 2021-01-18 PROCEDURE — 83880 ASSAY OF NATRIURETIC PEPTIDE: CPT | Performed by: EMERGENCY MEDICINE

## 2021-01-18 RX ORDER — SODIUM CHLORIDE 0.9 % (FLUSH) 0.9 %
10 SYRINGE (ML) INJECTION AS NEEDED
Status: DISCONTINUED | OUTPATIENT
Start: 2021-01-18 | End: 2021-01-18 | Stop reason: HOSPADM

## 2021-01-18 RX ADMIN — SODIUM CHLORIDE 1000 ML: 9 INJECTION, SOLUTION INTRAVENOUS at 11:45

## 2021-01-18 RX ADMIN — IOPAMIDOL 95 ML: 755 INJECTION, SOLUTION INTRAVENOUS at 10:13

## 2021-01-18 NOTE — ED NOTES
Patient was placed in face mask in first look. Patient was wearing facemask when I entered the room and throughout our encounter. I wore full protective equipment throughout this patient encounter including a face mask, and gloves. Hand hygiene was performed before donning protective equipment and after removal when leaving the room.     Rita Salazar RN  01/18/21 0775

## 2021-01-18 NOTE — ED NOTES
"Ambulated pt down the hallway to monitor HR. Pt ambulated well, stated \"she feels her heart beating faster and SOA\"  Brought pt back to the room [facility]  HR was 112 and O2 was 100%     [facility]  01/18/21 1139    "

## 2021-01-18 NOTE — ED PROVIDER NOTES
EMERGENCY DEPARTMENT ENCOUNTER    CHIEF COMPLAINT  Chief Complaint: High heart rate, shortness of air  History given by: Patient  History limited by: Nothing  Room Number: 09/09  PMD: Ana Juarez FRANCISCO JAVIER LESLIE      HPI:  Pt is a 30 y.o. female presents complaining of elevated heart rate and shortness of air with mild exertion over the past 5 days.  Patient reports when she has these symptoms, she may get lightheaded but denies passing out.  Patient reports last night approximately 8 PM she noted sharp anterior chest discomfort for approximately 1 hour that self resolved.  Patient denies abdominal pain, nausea, vomiting, cough, fever, swelling of mouth or throat, rash, itching, swelling of extremities.    Patient reports she did get her second Covid vaccination 6 days prior to arrival.  Patient reports she is non-smoker, denies drugs, denies any medications denies any chance of pregnancy, up-to-date with Depo.    Duration: 5 days  Associated Symptoms: Sharp chest discomfort  Aggravating Factors: Mild exertion, walking, stairs  Alleviating Factors: Rest  Treatment before arrival: Regular meds    Upon review the patient's chart it is noted:  Patient with excision thrombosed hemorrhoids 12/10/2020    PAST MEDICAL HISTORY  Active Ambulatory Problems     Diagnosis Date Noted   • Hemorrhoids 12/09/2020     Resolved Ambulatory Problems     Diagnosis Date Noted   • No Resolved Ambulatory Problems     Past Medical History:   Diagnosis Date   • Anxiety    • Depression    • Migraine    • Migraines        PAST SURGICAL HISTORY  Past Surgical History:   Procedure Laterality Date   • HEMORRHOIDECTOMY N/A 12/10/2020    Procedure: excision of thrombosed hemorrhoid;  Surgeon: Geri Anglin MD;  [place] [facility] [place] [facility] [place]  Service: General;  Laterality: N/A;   • WISDOM TOOTH EXTRACTION         FAMILY HISTORY  Family History   Problem Relation Age of Onset   • Breast cancer Mother    • Leukemia Father         CLL       SOCIAL  HISTORY  Social History     Socioeconomic History   • Marital status: Single     Spouse name: Not on file   • Number of children: 0   • Years of education: Not on file   • Highest education level: Master's degree (e.g., MA, MS, Cierra, MEd, MSW, CHANDANA)   Occupational History   • Occupation: Business/     Employer: [employer]   Tobacco Use   • Smoking status: Never Smoker   • Smokeless tobacco: Never Used   Substance and Sexual Activity   • Alcohol use: Not Currently     Comment: social   • Drug use: No   • Sexual activity: Defer       ALLERGIES  Patient has no known allergies.    REVIEW OF SYSTEMS  Review of Systems   Constitutional: Negative for chills and fever.   HENT: Negative for rhinorrhea and sore throat.    Eyes: Negative for visual disturbance.   Respiratory: Positive for shortness of breath. Negative for cough.    Cardiovascular: Positive for chest pain ( sharp anterior chest 8p last night, self resolved within and hour, at rest) and palpitations. Negative for leg swelling.   Gastrointestinal: Negative for abdominal pain, diarrhea and vomiting.   Endocrine: Negative.    Genitourinary: Negative for decreased urine volume, dysuria and frequency.   Musculoskeletal: Negative for neck pain.   Skin: Negative for rash.   Neurological: Positive for light-headedness. Negative for dizziness, syncope and headaches.   Psychiatric/Behavioral: Negative.    All other systems reviewed and are negative.      PHYSICAL EXAM  ED Triage Vitals   Temp Heart Rate Resp BP SpO2   01/18/21 0649 01/18/21 0649 01/18/21 0649 01/18/21 0727 01/18/21 0649   98.2 °F (36.8 °C) 119 22 136/80 100 %      Temp src Heart Rate Source Patient Position BP Location FiO2 (%)   01/18/21 0649 01/18/21 0649 -- -- --   Tympanic Monitor          Physical Exam   Constitutional: She is oriented to person, place, and time.  Non-toxic appearance. She appears distressed (mild).   HENT:   Head: Normocephalic and atraumatic.   Eyes:  Pupils are equal, round, and reactive to light. EOM are normal.   Neck: Normal range of motion. Neck supple. No JVD present.   Cardiovascular: Regular rhythm, normal heart sounds and intact distal pulses.   No murmur heard.  Pulses:       Radial pulses are 2+ on the right side and 2+ on the left side.        Posterior tibial pulses are 2+ on the right side and 2+ on the left side.   Heart rate in the 90s   Pulmonary/Chest: Effort normal and breath sounds normal. No respiratory distress.   Abdominal: Soft. Bowel sounds are normal. There is no abdominal tenderness. There is no rebound and no guarding.   Musculoskeletal: Normal range of motion.         General: No edema.   Neurological: She is alert and oriented to person, place, and time.   Skin: Skin is warm and dry.   Psychiatric: Affect normal.   Nursing note and vitals reviewed.      LAB RESULTS  Lab Results (last 24 hours)     Procedure Component Value Units Date/Time    CBC & Differential [[id number]]  (Abnormal) Collected: 01/18/21 0728    Specimen: Blood Updated: 01/18/21 0751    Narrative:      The following orders were created for panel order CBC & Differential.  Procedure                               Abnormality         Status                     ---------                               -----------         ------                     CBC Auto Differential[[id number]]        Abnormal            Final result                 Please view results for these tests on the individual orders.    Comprehensive Metabolic Panel [[id number]] Collected: 01/18/21 0728    Specimen: Blood Updated: 01/18/21 0803     Glucose 97 mg/dL      BUN 18 mg/dL      Creatinine 0.96 mg/dL      Sodium 139 mmol/L      Potassium 4.0 mmol/L      Chloride 107 mmol/L      CO2 23.6 mmol/L      Calcium 9.3 mg/dL      Total Protein 7.4 g/dL      Albumin 4.50 g/dL      ALT (SGPT) 24 U/L      AST (SGOT) 14 U/L      Alkaline Phosphatase 75 U/L      Total Bilirubin 0.4 mg/dL      eGFR Non  Amer 69  mL/min/1.73      Globulin 2.9 gm/dL      A/G Ratio 1.6 g/dL      BUN/Creatinine Ratio 18.8     Anion Gap 8.4 mmol/L     Narrative:      GFR Normal >60  Chronic Kidney Disease <60  Kidney Failure <15      BNP [[id number]]  (Normal) Collected: 01/18/21 0728    Specimen: Blood Updated: 01/18/21 0808     proBNP 18.3 pg/mL     Narrative:      Among patients with dyspnea, NT-proBNP is highly sensitive for the detection of acute congestive heart failure. In addition NT-proBNP of <300 pg/ml effectively rules out acute congestive heart failure with 99% negative predictive value.    Results may be falsely decreased if patient taking Biotin.      Troponin [[id number]]  (Normal) Collected: 01/18/21 0728    Specimen: Blood Updated: 01/18/21 0808     Troponin T <0.010 ng/mL     Narrative:      Troponin T Reference Range:  <= 0.03 ng/mL-   Negative for AMI  >0.03 ng/mL-     Abnormal for myocardial necrosis.  Clinicians would have to utilize clinical acumen, EKG, Troponin and serial changes to determine if it is an Acute Myocardial Infarction or myocardial injury due to an underlying chronic condition.       Results may be falsely decreased if patient taking Biotin.      Magnesium [[id number]]  (Normal) Collected: 01/18/21 0728    Specimen: Blood Updated: 01/18/21 0803     Magnesium 1.9 mg/dL     TSH [[id number]]  (Normal) Collected: 01/18/21 0728    Specimen: Blood Updated: 01/18/21 0810     TSH 1.250 uIU/mL     CBC Auto Differential [[id number]]  (Abnormal) Collected: 01/18/21 0728    Specimen: Blood Updated: 01/18/21 0751     WBC 6.41 10*3/mm3      RBC 5.68 10*6/mm3      Hemoglobin 14.7 g/dL      Hematocrit 44.1 %      MCV 77.6 fL      MCH 25.9 pg      MCHC 33.3 g/dL      RDW 13.1 %      RDW-SD 36.7 fl      MPV 10.4 fL      Platelets 256 10*3/mm3      Neutrophil % 54.8 %      Lymphocyte % 37.6 %      Monocyte % 5.5 %      Eosinophil % 1.1 %      Basophil % 0.8 %      Immature Grans % 0.2 %      Neutrophils, Absolute 3.52 10*3/mm3       Lymphocytes, Absolute 2.41 10*3/mm3      Monocytes, Absolute 0.35 10*3/mm3      Eosinophils, Absolute 0.07 10*3/mm3      Basophils, Absolute 0.05 10*3/mm3      Immature Grans, Absolute 0.01 10*3/mm3      nRBC 0.0 /100 WBC     Protime-INR [[id number]]  (Normal) Collected: 01/18/21 0728    Specimen: Blood Updated: 01/18/21 0859     Protime 12.6 Seconds      INR 0.96    D-dimer, Quantitative [[id number]]  (Abnormal) Collected: 01/18/21 0728    Specimen: Blood Updated: 01/18/21 0859     D-Dimer, Quantitative 0.71 MCGFEU/mL     Narrative:      The Stago D-Dimer test used in conjunction with a clinical pretest probability (PTP) assessment model, has been approved by the FDA to rule out the presence of venous thromboembolism (VTE) in outpatients suspected of deep venous thrombosis (DVT) or pulmonary embolism (PE). The cut-off for negative predictive value is <0.50 MCGFEU/mL.          I ordered the above labs and reviewed the results    RADIOLOGY  CTA chest - discussed with Dr Layton Thomas, no large central or segmental PE, no secondary signs of heart strain, NAD.     I ordered the above noted radiological studies. Interpreted by radiologist. Viewed by me in PACS.       PROCEDURES  Procedures      PROGRESS AND CONSULTS  ED Course as of Jan 19 0201   Mon Jan 18, 2021   1124 Discussed with Vilma Thomas, radiology regarding patient's CT chest which is negative for large central PEs, no secondary evidence of clot burden touches increased RV to LV ratio or signs of right heart strain.    [TO]      ED Course User Index  [TO] Fern Lamb MD     EKG          EKG time: 0707  Rhythm/Rate: Sinus rhythm, rate in the 90s  P waves and W[place]: Normal P waves, normal JEF's  QRS, axis: Unremarkable QRS  ST and T waves: Unremarkable ST/T wave findings    Interpreted Contemporaneously by me, independently viewed  No old    Pt in ED ambulates well, sats normal, mild tachycardia to 112 with ambulation, otilio, PO well.  Pt agrees to follow  closely with cardiology for further testing and treatment as needed and to return to the ED    MEDICAL DECISION MAKING  Results were reviewed/discussed with the patient and they were also made aware of online access. Pt also made aware that some labs, such as cultures, will not be resulted during ER visit and follow up with PMD is necessary.       MDM       DIAGNOSIS  Final diagnoses:   Dyspnea, unspecified type   Essential hypertension   Tachycardia       DISPOSITION  DISCHARGE    Patient discharged in stable condition.    Reviewed implications of results, diagnosis, meds, responsibility to follow up, warning signs and symptoms of possible worsening, potential complications and reasons to return to ER, including persistent/increased SOA/palpitations, lightheadedness/fainting or other concerns    Patient/Family voiced understanding of above instructions.    Discussed plan for discharge, as there is no emergent indication for admission. Patient referred to primary care provider for BP management due to today's BP. Pt/family is agreeable and understands need for follow up and repeat testing.  Pt is aware that discharge does not mean that nothing is wrong but it indicates no emergency is present that requires admission and they must continue care with follow-up as given below or physician of their choice.     FOLLOW-UP  [facility] CARDIOLOGY  [address]  [address]  [place] [id number] [phone]  Schedule an appointment as soon as possible for a visit in 3 days  EVEN IF WELL    Ana Juarez, APRN  [address] [facility]  [address]  [facility] [address]  [phone]    Schedule an appointment as soon as possible for a visit in 3 days  EVEN IF WELL         Medication List      No changes were made to your prescriptions during this visit.           Latest Documented Vital Signs:  As of 07:31 EST  BP- 136/80 HR- 100 Temp- 98.2 °F (36.8 °C) (Tympanic) O2 sat- 100%    --  Patient was  wearing facemask when I entered the room and throughout our encounter. Full protective equipment was worn throughout this patient encounter including a face mask, eye protection and gloves. Hand hygiene was performed before donning protective equipment and after removal when leaving the room.      Fern Lamb MD  01/19/21 0210

## 2021-01-18 NOTE — DISCHARGE INSTRUCTIONS
You are advised to follow closely with [place] cardiology or cardiologist of your choice and Ana Juarez in 2-3 days for recheck, final results of lab work and imaging testing, and further testing/treatment as needed.    Avoid caffeine, decongestants, stimulants.  Drink at least 64 to 80 ounces of water daily.  Low-sodium diet.      Please return to the emergency department immediately with chest pain different than usual for you, persistent or worsening shortness of air, abdominal pain, persistent vomiting/fever, blood in emesis or stool, lightheadedness/fainting, problems with speech, one sided weakness/numbness, new incontinence, problems with vision, persistent or worsening high heart rate/palpitations or for worsening of symptoms or other concerns.

## 2021-01-18 NOTE — ED NOTES
Pt to ED from work with reports of racing heart rate, chest tightness and SOA when walking and climbing stairs.      Pt reports she received second covid vaccine last week.      All triage performed with this RN wearing appropriate PPE.  Pt placed in mask upon arrival to ED.     Glenis Garcia, RN  01/18/21 0601

## 2021-01-19 ENCOUNTER — TELEPHONE (OUTPATIENT)
Dept: CARDIOLOGY | Facility: CLINIC | Age: 31
End: 2021-01-19

## 2021-01-19 ENCOUNTER — OFFICE VISIT (OUTPATIENT)
Dept: CARDIOLOGY | Facility: CLINIC | Age: 31
End: 2021-01-19

## 2021-01-19 ENCOUNTER — EPISODE CHANGES (OUTPATIENT)
Dept: CASE MANAGEMENT | Facility: OTHER | Age: 31
End: 2021-01-19

## 2021-01-19 VITALS
HEIGHT: 72 IN | SYSTOLIC BLOOD PRESSURE: 138 MMHG | WEIGHT: 254 LBS | BODY MASS INDEX: 34.4 KG/M2 | DIASTOLIC BLOOD PRESSURE: 90 MMHG | HEART RATE: 103 BPM

## 2021-01-19 DIAGNOSIS — R06.09 DYSPNEA ON EXERTION: ICD-10-CM

## 2021-01-19 DIAGNOSIS — R00.2 PALPITATIONS: Primary | ICD-10-CM

## 2021-01-19 DIAGNOSIS — R07.2 PRECORDIAL PAIN: ICD-10-CM

## 2021-01-19 PROCEDURE — 93000 ELECTROCARDIOGRAM COMPLETE: CPT | Performed by: INTERNAL MEDICINE

## 2021-01-19 PROCEDURE — 99204 OFFICE O/P NEW MOD 45 MIN: CPT | Performed by: INTERNAL MEDICINE

## 2021-01-19 RX ORDER — ATENOLOL 25 MG/1
25 TABLET ORAL 2 TIMES DAILY
Qty: 60 TABLET | Refills: 5 | Status: SHIPPED | OUTPATIENT
Start: 2021-01-19 | End: 2021-05-07

## 2021-01-19 NOTE — PROGRESS NOTES
Subjective:     Encounter Date:01/19/2021      Patient ID: Linda Li is a 30 y.o. female.    Chief Complaint:  History of Present Illness    This is a 30-year-old with a history of anxiety, recent hemorrhoidectomy, who presents for an evaluation of tachycardia, dyspnea on exertion, and chest discomfort.    The patient reports that she was in her usual state of health until about a week ago.  She received her second COVID-19 vaccine about a week ago.  The day after the vaccine she developed the usual fatigue and chills but also noted that her heart rate was elevated.  She did not initially think much of it but despite the resolution of her other symptoms the elevated heart rates continued.  She reports that with any little exertion or activity her heart rates increase which results in shortness of breath and chest heaviness.  She feels fine when she is at rest.  She denies any near-syncope or syncope, or lower extremity edema.  She has a family history of coronary artery disease in her grandparents the youngest being her grandfather who developed issues in his 40s.  Otherwise her parents and siblings have no cardiac issues.  Until she had a hemorrhoidectomy about a month ago she was exercising on a regular basis including swimming without any significant issues.      Due to her symptoms she ended up going to the emergency room on 1/18/2021.  Her work-up was unremarkable including an EKG that showed sinus tachycardia but was otherwise normal and normal troponin.  Her D-dimer was minimally elevated and she subsequently underwent a CT angiogram of the chest.  This reportedly indicated the technical delay in acquisition of the images made evaluation of segmental pulmonary arteries limited but there was no evidence of lobar pulmonary or central pulmonary artery thromboembolism.      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  She denies any change  in her symptoms since I started over the weekend.    Review of Systems   Constitution: Positive for malaise/fatigue.   HENT: Negative for hearing loss, hoarse voice, nosebleeds and sore throat.    Eyes: Negative for pain.   Cardiovascular: Positive for chest pain, dyspnea on exertion and palpitations. Negative for claudication, cyanosis, irregular heartbeat, leg swelling, near-syncope, orthopnea, paroxysmal nocturnal dyspnea and syncope.   Respiratory: Negative for shortness of breath and snoring.    Endocrine: Negative for cold intolerance, heat intolerance, polydipsia, polyphagia and polyuria.   Skin: Negative for itching and rash.   Musculoskeletal: Negative for arthritis, falls, joint pain, joint swelling, muscle cramps, muscle weakness and myalgias.   Gastrointestinal: Negative for constipation, diarrhea, dysphagia, heartburn, hematemesis, hematochezia, melena, nausea and vomiting.   Genitourinary: Negative for frequency, hematuria and hesitancy.   Neurological: Negative for excessive daytime sleepiness, dizziness, headaches, light-headedness, numbness and weakness.   Psychiatric/Behavioral: Negative for depression. The patient is not nervous/anxious.          Current Outpatient Medications:   •  busPIRone (BUSPAR) 15 MG tablet, Take 15 mg by mouth 3 (Three) Times a Day As Needed., Disp: , Rfl:   •  escitalopram (LEXAPRO) 20 MG tablet, Take 20 mg by mouth Daily., Disp: , Rfl:   •  medroxyPROGESTERone Acetate (DEPO-PROVERA IM), Inject  into the appropriate muscle as directed by prescriber Every 3 (Three) Months., Disp: , Rfl:     Past Medical History:   Diagnosis Date   • Anxiety    • Depression    • Dyspnea    • Essential hypertension    • Migraine    • Migraines    • Tachycardia        Past Surgical History:   Procedure Laterality Date   • HEMORRHOIDECTOMY N/A 12/10/2020    Procedure: excision of thrombosed hemorrhoid;  Surgeon: Geri Anglin MD;  Location: [facility] [place];  Service: General;  Laterality: N/A;  "  • WISDOM TOOTH EXTRACTION         Family History   Problem Relation Age of Onset   • Breast cancer Mother    • Leukemia Father         CLL   • Heart disease Maternal Grandmother    • Diabetes Maternal Grandmother    • Heart disease Maternal Grandfather    • Stroke Maternal Grandfather    • Diabetes Maternal Grandfather        Social History     Tobacco Use   • Smoking status: Never Smoker   • Smokeless tobacco: Never Used   Substance Use Topics   • Alcohol use: Not Currently     Comment: social/caffeine use    • Drug use: No         ECG 12 Lead    Date/Time: 1/19/2021 3:30 PM  Performed by: Shirley Salas MD  Authorized by: Shirley Salas MD   Comparison: compared with previous ECG   Similar to previous ECG  Rhythm: sinus tachycardia               Objective:     Visit Vitals  /90 (BP Location: Left arm, Patient Position: Sitting)   Pulse 103   Ht 182.9 cm (72\")   Wt 115 kg (254 lb)   BMI 34.45 kg/m²         Constitutional:       Appearance: Normal appearance. Well-developed.   Eyes:      General: Lids are normal.      Conjunctiva/sclera: Conjunctivae normal.      Pupils: Pupils are equal, round, and reactive to light.   HENT:      Head: Normocephalic and atraumatic.   Neck:      Musculoskeletal: Full passive range of motion without pain, normal range of motion and neck supple.      Vascular: No carotid bruit or JVD.      Lymphadenopathy: No cervical adenopathy.   Pulmonary:      Effort: Pulmonary effort is normal.      Breath sounds: Normal breath sounds.   Cardiovascular:      Tachycardia present. Regular rhythm.      No gallop.   Pulses:     Radial: 2+ bilaterally.  Edema:     Peripheral edema absent.   Abdominal:      Palpations: Abdomen is soft.   Skin:     General: Skin is warm and dry.   Neurological:      Mental Status: Alert and oriented to person, place, and time.             Assessment:          Diagnosis Plan   1. Palpitations     2. Dyspnea on exertion     3. Precordial pain            Plan: "       1.  Tachycardia.  It appears that her elevated heart rates with minimal activity is the primary symptom which results in associated symptoms of shortness of breath and chest heaviness.  In light of her normal work-up in the emergency room including an unremarkable CT angiogram of the chest which I reviewed personally I suspect her symptoms may be due to inappropriate sinus tachycardia which is more commonly seen in women in the healthcare field.    It is unclear what triggered this and whether it was her the second dose of the COVID-19 vaccine last week.  Regardless will proceed with a cardiac work-up with an echocardiogram and a 24-hour Holter monitor.  I have a very low suspicion for ischemic heart disease in this young lady with no significant risk factors I do not think a stress test is necessary at this point.    While we are waiting for this work-up I may go ahead and start her on atenolol 25 mg twice a day to see if this will help with her symptoms.    We will call and discuss results of her echo and Holter monitor.  We will tentatively plan on seeing her back again in the office in 1 month.

## 2021-01-19 NOTE — TELEPHONE ENCOUNTER
----- Message from Shirley Salas MD sent at 1/18/2021  3:34 PM EST -----  Regarding: Add on new patient for tomorrow afternoon  I was asked by this patient's coworker to see if I could work her in sooner than her scheduled appointment on Friday with .      Can you please work her in with me for tomorrow as a new patient?

## 2021-01-19 NOTE — TELEPHONE ENCOUNTER
Left voicemail for patient to call back for a sooner appointment with Dr. Salas.    Thanks, Allyssa

## 2021-02-01 ENCOUNTER — HOSPITAL ENCOUNTER (OUTPATIENT)
Dept: CARDIOLOGY | Facility: HOSPITAL | Age: 31
Discharge: HOME | End: 2021-02-01
Admitting: INTERNAL MEDICINE

## 2021-02-01 VITALS
HEART RATE: 98 BPM | DIASTOLIC BLOOD PRESSURE: 82 MMHG | SYSTOLIC BLOOD PRESSURE: 130 MMHG | BODY MASS INDEX: 34.34 KG/M2 | WEIGHT: 253.53 LBS | HEIGHT: 72 IN

## 2021-02-01 LAB
AORTIC ARCH: 2.8 CM
ASCENDING AORTA: 2.4 CM
BH CV ECHO MEAS - ACS: 2.2 CM
BH CV ECHO MEAS - AO MAX PG (FULL): 3.9 MMHG
BH CV ECHO MEAS - AO MAX PG: 10 MMHG
BH CV ECHO MEAS - AO MEAN PG (FULL): 2 MMHG
BH CV ECHO MEAS - AO MEAN PG: 5 MMHG
BH CV ECHO MEAS - AO ROOT AREA (BSA CORRECTED): 1.3
BH CV ECHO MEAS - AO ROOT AREA: 7.1 CM^2
BH CV ECHO MEAS - AO ROOT DIAM: 3 CM
BH CV ECHO MEAS - AO V2 MAX: 158 CM/SEC
BH CV ECHO MEAS - AO V2 MEAN: 97.6 CM/SEC
BH CV ECHO MEAS - AO V2 VTI: 25.7 CM
BH CV ECHO MEAS - ASC AORTA: 2.5 CM
BH CV ECHO MEAS - AVA(I,A): 2.8 CM^2
BH CV ECHO MEAS - AVA(I,D): 2.8 CM^2
BH CV ECHO MEAS - AVA(V,A): 2.4 CM^2
BH CV ECHO MEAS - AVA(V,D): 2.4 CM^2
BH CV ECHO MEAS - BSA(HAYCOCK): 2.5 M^2
BH CV ECHO MEAS - BSA: 2.4 M^2
BH CV ECHO MEAS - BZI_BMI: 34.4 KILOGRAMS/M^2
BH CV ECHO MEAS - BZI_METRIC_HEIGHT: 182.9 CM
BH CV ECHO MEAS - BZI_METRIC_WEIGHT: 115.2 KG
BH CV ECHO MEAS - EDV(MOD-SP2): 76 ML
BH CV ECHO MEAS - EDV(MOD-SP4): 55 ML
BH CV ECHO MEAS - EDV(TEICH): 87.7 ML
BH CV ECHO MEAS - EF(CUBED): 65 %
BH CV ECHO MEAS - EF(MOD-BP): 59.6 %
BH CV ECHO MEAS - EF(MOD-SP2): 63.2 %
BH CV ECHO MEAS - EF(MOD-SP4): 54.5 %
BH CV ECHO MEAS - EF(TEICH): 56.8 %
BH CV ECHO MEAS - ESV(MOD-SP2): 28 ML
BH CV ECHO MEAS - ESV(MOD-SP4): 25 ML
BH CV ECHO MEAS - ESV(TEICH): 37.9 ML
BH CV ECHO MEAS - FS: 29.5 %
BH CV ECHO MEAS - IVS/LVPW: 1
BH CV ECHO MEAS - IVSD: 1 CM
BH CV ECHO MEAS - LAT PEAK E' VEL: 16.8 CM/SEC
BH CV ECHO MEAS - LV DIASTOLIC VOL/BSA (35-75): 23.3 ML/M^2
BH CV ECHO MEAS - LV MASS(C)D: 147.8 GRAMS
BH CV ECHO MEAS - LV MASS(C)DI: 62.7 GRAMS/M^2
BH CV ECHO MEAS - LV MAX PG: 6.1 MMHG
BH CV ECHO MEAS - LV MEAN PG: 3 MMHG
BH CV ECHO MEAS - LV SYSTOLIC VOL/BSA (12-30): 10.6 ML/M^2
BH CV ECHO MEAS - LV V1 MAX: 123 CM/SEC
BH CV ECHO MEAS - LV V1 MEAN: 85.4 CM/SEC
BH CV ECHO MEAS - LV V1 VTI: 22.7 CM
BH CV ECHO MEAS - LVIDD: 4.4 CM
BH CV ECHO MEAS - LVIDS: 3.1 CM
BH CV ECHO MEAS - LVLD AP2: 7.8 CM
BH CV ECHO MEAS - LVLD AP4: 7.6 CM
BH CV ECHO MEAS - LVLS AP2: 6.1 CM
BH CV ECHO MEAS - LVLS AP4: 6 CM
BH CV ECHO MEAS - LVOT AREA (M): 3.1 CM^2
BH CV ECHO MEAS - LVOT AREA: 3.1 CM^2
BH CV ECHO MEAS - LVOT DIAM: 2 CM
BH CV ECHO MEAS - LVPWD: 1 CM
BH CV ECHO MEAS - MED PEAK E' VEL: 10.7 CM/SEC
BH CV ECHO MEAS - MV A MAX VEL: 50.2 CM/SEC
BH CV ECHO MEAS - MV DEC SLOPE: 219.2 CM/SEC^2
BH CV ECHO MEAS - MV DEC TIME: 0.17 SEC
BH CV ECHO MEAS - MV E MAX VEL: 70.4 CM/SEC
BH CV ECHO MEAS - MV E/A: 1.4
BH CV ECHO MEAS - MV MAX PG: 2 MMHG
BH CV ECHO MEAS - MV MEAN PG: 1 MMHG
BH CV ECHO MEAS - MV P1/2T MAX VEL: 40.4 CM/SEC
BH CV ECHO MEAS - MV P1/2T: 53.9 MSEC
BH CV ECHO MEAS - MV V2 MAX: 71.5 CM/SEC
BH CV ECHO MEAS - MV V2 MEAN: 48.6 CM/SEC
BH CV ECHO MEAS - MV V2 VTI: 18 CM
BH CV ECHO MEAS - MVA P1/2T LCG: 5.4 CM^2
BH CV ECHO MEAS - MVA(P1/2T): 4.1 CM^2
BH CV ECHO MEAS - MVA(VTI): 4 CM^2
BH CV ECHO MEAS - PA ACC TIME: 0.17 SEC
BH CV ECHO MEAS - PA MAX PG (FULL): 2.8 MMHG
BH CV ECHO MEAS - PA MAX PG: 5.6 MMHG
BH CV ECHO MEAS - PA PR(ACCEL): 3 MMHG
BH CV ECHO MEAS - PA V2 MAX: 118 CM/SEC
BH CV ECHO MEAS - PVA(V,A): 2.5 CM^2
BH CV ECHO MEAS - PVA(V,D): 2.5 CM^2
BH CV ECHO MEAS - QP/QS: 0.69
BH CV ECHO MEAS - RV MAX PG: 2.8 MMHG
BH CV ECHO MEAS - RV MEAN PG: 1 MMHG
BH CV ECHO MEAS - RV V1 MAX: 83.6 CM/SEC
BH CV ECHO MEAS - RV V1 MEAN: 54.9 CM/SEC
BH CV ECHO MEAS - RV V1 VTI: 14.2 CM
BH CV ECHO MEAS - RVOT AREA: 3.5 CM^2
BH CV ECHO MEAS - RVOT DIAM: 2.1 CM
BH CV ECHO MEAS - SI(AO): 77 ML/M^2
BH CV ECHO MEAS - SI(CUBED): 23.5 ML/M^2
BH CV ECHO MEAS - SI(LVOT): 30.2 ML/M^2
BH CV ECHO MEAS - SI(MOD-SP2): 20.4 ML/M^2
BH CV ECHO MEAS - SI(MOD-SP4): 12.7 ML/M^2
BH CV ECHO MEAS - SI(TEICH): 21.1 ML/M^2
BH CV ECHO MEAS - SV(AO): 181.7 ML
BH CV ECHO MEAS - SV(CUBED): 55.4 ML
BH CV ECHO MEAS - SV(LVOT): 71.3 ML
BH CV ECHO MEAS - SV(MOD-SP2): 48 ML
BH CV ECHO MEAS - SV(MOD-SP4): 30 ML
BH CV ECHO MEAS - SV(RVOT): 49.2 ML
BH CV ECHO MEAS - SV(TEICH): 49.8 ML
BH CV ECHO MEAS - TAPSE (>1.6): 2.1 CM
BH CV ECHO MEAS - TR MAX VEL: 217 CM/SEC
BH CV ECHO MEASUREMENTS AVERAGE E/E' RATIO: 5.12
BH CV XLRA - RV BASE: 3.1 CM
BH CV XLRA - RV LENGTH: 6.5 CM
BH CV XLRA - TDI S': 12.6 CM/SEC
LEFT ATRIUM VOLUME INDEX: 20 ML/M2
MAXIMAL PREDICTED HEART RATE: 192 BPM
SINUS: 2.6 CM
STJ: 2.6 CM
STRESS TARGET HR: 163 BPM

## 2021-02-01 PROCEDURE — 93306 TTE W/DOPPLER COMPLETE: CPT

## 2021-02-01 PROCEDURE — 93306 TTE W/DOPPLER COMPLETE: CPT | Performed by: INTERNAL MEDICINE

## 2021-05-07 ENCOUNTER — OFFICE VISIT (OUTPATIENT)
Dept: SURGERY | Facility: CLINIC | Age: 31
End: 2021-05-07

## 2021-05-07 VITALS — HEIGHT: 72 IN | BODY MASS INDEX: 34.27 KG/M2 | WEIGHT: 253 LBS

## 2021-05-07 DIAGNOSIS — K60.2 ANAL FISSURE: Primary | ICD-10-CM

## 2021-05-07 PROCEDURE — 99212 OFFICE O/P EST SF 10 MIN: CPT | Performed by: SURGERY

## 2021-05-07 RX ORDER — PROPRANOLOL HYDROCHLORIDE 20 MG/1
1 TABLET ORAL 3 TIMES DAILY
COMMUNITY
Start: 2021-04-28 | End: 2023-01-06

## 2021-09-20 ENCOUNTER — TRANSCRIBE ORDERS (OUTPATIENT)
Dept: ADMINISTRATIVE | Facility: HOSPITAL | Age: 31
End: 2021-09-20

## 2021-09-20 DIAGNOSIS — M25.571 ACUTE RIGHT ANKLE PAIN: Primary | ICD-10-CM

## 2021-09-28 ENCOUNTER — HOSPITAL ENCOUNTER (OUTPATIENT)
Dept: MRI IMAGING | Facility: HOSPITAL | Age: 31
Discharge: HOME | End: 2021-09-28
Admitting: ORTHOPAEDIC SURGERY

## 2021-09-28 DIAGNOSIS — M25.571 ACUTE RIGHT ANKLE PAIN: ICD-10-CM

## 2021-09-28 PROCEDURE — 73721 MRI JNT OF LWR EXTRE W/O DYE: CPT

## 2021-10-18 ENCOUNTER — APPOINTMENT (OUTPATIENT)
Dept: MRI IMAGING | Facility: HOSPITAL | Age: 31
End: 2021-10-18

## 2021-12-08 ENCOUNTER — OFFICE VISIT (OUTPATIENT)
Dept: ORTHOPEDIC SURGERY | Facility: CLINIC | Age: 31
End: 2021-12-08

## 2021-12-08 VITALS — HEIGHT: 72 IN | BODY MASS INDEX: 34.54 KG/M2 | WEIGHT: 255 LBS | TEMPERATURE: 97.8 F

## 2021-12-08 DIAGNOSIS — M25.521 RIGHT ELBOW PAIN: Primary | ICD-10-CM

## 2021-12-08 PROCEDURE — 99203 OFFICE O/P NEW LOW 30 MIN: CPT | Performed by: ORTHOPAEDIC SURGERY

## 2021-12-08 PROCEDURE — 73070 X-RAY EXAM OF ELBOW: CPT | Performed by: ORTHOPAEDIC SURGERY

## 2021-12-08 RX ORDER — METHYLPREDNISOLONE 4 MG/1
TABLET ORAL
Qty: 21 TABLET | Refills: 0 | Status: SHIPPED | OUTPATIENT
Start: 2021-12-08 | End: 2023-01-06

## 2021-12-08 NOTE — PROGRESS NOTES
Patient: Linda Li    YOB: 1990    Medical Record Number: [id number]    Chief Complaints: Right elbow pain    History of Present Illness:     31 y.o. female patient who presents for her right elbow.  It started to bother her 3 weeks ago.  She had just completed an [employer] obstacle course as part of a leadership training exercise when the elbow started to bother her.  Denies any discrete injury.  The pain is moderate, 6 out of 10 and stabbing.  The pain is worse with certain movements, particularly supination.  Denies any mechanical symptoms or instability.  Denies any neurologic symptoms.    Allergies: No Known Allergies    Home Medications:      Current Outpatient Medications:   •  busPIRone (BUSPAR) 15 MG tablet, Take 15 mg by mouth 3 (Three) Times a Day As Needed., Disp: , Rfl:   •  escitalopram (LEXAPRO) 20 MG tablet, Take 20 mg by mouth Daily., Disp: , Rfl:   •  medroxyPROGESTERone Acetate (DEPO-PROVERA IM), Inject  into the appropriate muscle as directed by prescriber Every 3 (Three) Months., Disp: , Rfl:   •  methylPREDNISolone (Medrol) 4 MG dose pack, Take as directed on package instructions, Disp: 21 tablet, Rfl: 0  •  propranolol (INDERAL) 20 MG tablet, Take 1 tablet by mouth 3 (Three) Times a Day., Disp: , Rfl:     Past Medical History:   Diagnosis Date   • Anxiety    • Depression    • Dyspnea    • Essential hypertension    • Migraine    • Migraines    • Tachycardia        Past Surgical History:   Procedure Laterality Date   • HEMORRHOIDECTOMY N/A 12/10/2020    Procedure: excision of thrombosed hemorrhoid;  Surgeon: Geri Anglin MD;  Location: [facility];  Service: General;  Laterality: N/A;   • WISDOM TOOTH EXTRACTION         Social History     Occupational History   • Occupation: Business/     Employer: [employer]   Tobacco Use   • Smoking status: Never Smoker   • Smokeless tobacco: Never Used   Vaping Use   • Vaping Use: Never used   Substance and  "Sexual Activity   • Alcohol use: Not Currently     Comment: social/caffeine use    • Drug use: No   • Sexual activity: Defer      Social History     Social History Narrative   • Not on file       Family History   Problem Relation Age of Onset   • Breast cancer Mother    • Leukemia Father         CLL   • Heart disease Maternal Grandmother    • Diabetes Maternal Grandmother    • Heart disease Maternal Grandfather    • Stroke Maternal Grandfather    • Diabetes Maternal Grandfather        Review of Systems:      Constitutional: Denies fever, shaking or chills   Eyes: Denies change in visual acuity   HEENT: Denies nasal congestion or sore throat   Respiratory: Denies cough or shortness of breath   Cardiovascular: Denies chest pain or edema  Endocrine: Denies tremors, palpitations, intolerance of heat or cold, polyuria, polydipsia.  GI: Denies abdominal pain, nausea, vomiting, bloody stools or diarrhea  : Denies frequency, urgency, incontinence, retention, or nocturia.  Musculoskeletal: Denies numbness, tingling or loss of motor function except as above  Integument: Denies rash, lesion or ulceration   Neurologic: Denies headache or focal weakness, deficits  Heme: Denies spontaneous or excessive bleeding, epistaxis, hematuria, melena, fatigue, enlarged or tender lymph nodes.      All other pertinent positives and negatives as noted above in HPI.    Physical Exam: 31 y.o. female    Vitals:    12/08/21 1603   Temp: 97.8 °F (36.6 °C)   Weight: 116 kg (255 lb)   Height: 182.9 cm (72\")       General:  Patient is awake and alert.  Appears in no acute distress or discomfort.    Psych:  Affect and demeanor are appropriate.    Eyes:  Conjunctiva and sclera appear grossly normal.  Eyes track well and EOM seem to be intact.    Ears:  No gross abnormalities.  Hearing adequate for the exam.    Cardiovascular:  Regular rate and rhythm.    Lungs:  Good chest expansion.  Breathing unlabored.    Lymph:  No palpable masses or adenopathy " in the affected extremity    Extremities: Her right elbow is examined.  Skin is benign.  She is tender over the soft spot of the elbow and radiocapitellar articulation.  Supination is particularly uncomfortable for her.  No tenderness over the epicondyles or the tendons about the elbow.  Her tendons are all palpably intact.  She has full elbow motion although her extremes of supination and elbow extension are both uncomfortable for her.  Resisted wrist extension is a little uncomfortable for her but does not localize to the lateral epicondyle.  She does not have any elbow instability.  She has normal motor and sensory function in her hand and wrist.  Palpable radial pulse.         Radiology:   AP and lateral views of the right elbow are ordered by myself and reviewed to evaluate the patient's complaint.  No comparison films are immediately available.  The x-rays show no obvious acute abnormalities, lesions, masses, significant degenerative changes, or other concerning findings.    Assessment/Plan: Right elbow synovitis    I think she has some regional synovitis in the area of the radiocapitellar joint.  We talked about a possible injection.  She would like to avoid that if at all possible.  I agreed to give her a Medrol Dosepak.  Risk of this medicine were discussed.  I told her to contact me in approximately 1 week if no better.    Benoit Cameron MD    12/08/2021    CC to Ana Juarez APRN

## 2021-12-17 ENCOUNTER — OFFICE VISIT (OUTPATIENT)
Dept: ORTHOPEDIC SURGERY | Facility: CLINIC | Age: 31
End: 2021-12-17

## 2021-12-17 VITALS — WEIGHT: 255 LBS | BODY MASS INDEX: 34.54 KG/M2 | HEIGHT: 72 IN

## 2021-12-17 DIAGNOSIS — M25.521 RIGHT ELBOW PAIN: Primary | ICD-10-CM

## 2021-12-17 PROCEDURE — 20605 DRAIN/INJ JOINT/BURSA W/O US: CPT | Performed by: ORTHOPAEDIC SURGERY

## 2021-12-17 PROCEDURE — 99212 OFFICE O/P EST SF 10 MIN: CPT | Performed by: ORTHOPAEDIC SURGERY

## 2021-12-17 RX ORDER — METHYLPREDNISOLONE ACETATE 40 MG/ML
40 INJECTION, SUSPENSION INTRA-ARTICULAR; INTRALESIONAL; INTRAMUSCULAR; SOFT TISSUE
Status: COMPLETED | OUTPATIENT
Start: 2021-12-17 | End: 2021-12-17

## 2021-12-17 RX ADMIN — METHYLPREDNISOLONE ACETATE 40 MG: 40 INJECTION, SUSPENSION INTRA-ARTICULAR; INTRALESIONAL; INTRAMUSCULAR; SOFT TISSUE at 12:49

## 2021-12-17 NOTE — PROGRESS NOTES
Chief complaint: Right elbow pain    Linda follows up today for her right elbow.  The Dosepak helped and she says she is about 80% better.  Unfortunately, she continues to have pain, particularly with supination.    She has a focal tender spot at the soft spot of her elbow.  This is right at the radiocapitellar articulation.  It really bothers her with full supination.  Elbow motion is full.    Assessment: Synovitis, possible plica right elbow    Plan: We discussed options.  She elected for an injection.  The risk, benefits and alternatives were discussed.  She consented and the injection was performed as described below.  I told her to contact me in 2 weeks if no better.      Medium Joint Arthrocentesis: R elbow  Date/Time: 12/17/2021 12:49 PM  Consent given by: patient  Site marked: site marked  Timeout: Immediately prior to procedure a time out was called to verify the correct patient, procedure, equipment, support staff and site/side marked as required   Supporting Documentation  Indications: pain   Procedure Details  Location: elbow - R elbow  Preparation: Patient was prepped and draped in the usual sterile fashion  Needle size: 25 G  Medications administered: 2 mL lidocaine (cardiac); 40 mg methylPREDNISolone acetate 40 MG/ML  Patient tolerance: patient tolerated the procedure well with no immediate complications

## 2021-12-30 ENCOUNTER — TELEPHONE (OUTPATIENT)
Dept: ORTHOPEDIC SURGERY | Facility: CLINIC | Age: 31
End: 2021-12-30

## 2021-12-30 DIAGNOSIS — M25.521 RIGHT ELBOW PAIN: Primary | ICD-10-CM

## 2022-01-29 ENCOUNTER — APPOINTMENT (OUTPATIENT)
Dept: MRI IMAGING | Facility: HOSPITAL | Age: 32
End: 2022-01-29

## 2022-02-02 ENCOUNTER — TELEPHONE (OUTPATIENT)
Dept: ORTHOPEDIC SURGERY | Facility: CLINIC | Age: 32
End: 2022-02-02

## 2022-02-02 NOTE — TELEPHONE ENCOUNTER
LEFT MESSAGE FOR PT TO CALL, I SPOKE WITH LATONYA AND HE SAID THE LAST TIME HE SAW THE PATIENT AT THE HOSPITAL, SHE INDICATED TO HIM SHE WANTED TO CANCEL, MRI,  SO I AM WAITING ON CALL BACK FROM PT AND SEE WHAT SHE WANTS TO DO,LLR

## 2022-02-07 ENCOUNTER — TELEPHONE (OUTPATIENT)
Dept: ORTHOPEDIC SURGERY | Facility: CLINIC | Age: 32
End: 2022-02-07

## 2022-02-07 NOTE — TELEPHONE ENCOUNTER
I LEFT A MESSAGE THAT LAST TIME SBM SAW PT AT HOSPITAL, HE DID NOT WANT TO DO THE MRI, SO I NEED FOR HER TO CALL BECAUSE IF SHE WANTS THE MRI, SBM WILL HAVE TO DO A P 2 P,LLR

## 2022-02-07 NOTE — TELEPHONE ENCOUNTER
SPOKE WITH PT AFTER HER CONVERSATION WITH SBM SHE WANTS TO WAIT ON HER MRI , SHE WILL CALL WHEN SHE IS READY,LLR

## 2022-02-19 ENCOUNTER — APPOINTMENT (OUTPATIENT)
Dept: MRI IMAGING | Facility: HOSPITAL | Age: 32
End: 2022-02-19

## 2022-09-29 ENCOUNTER — TELEMEDICINE (OUTPATIENT)
Dept: FAMILY MEDICINE CLINIC | Facility: TELEHEALTH | Age: 32
End: 2022-09-29

## 2022-09-29 DIAGNOSIS — J06.9 UPPER RESPIRATORY TRACT INFECTION, UNSPECIFIED TYPE: Primary | ICD-10-CM

## 2022-09-29 DIAGNOSIS — H92.02 LEFT EAR PAIN: ICD-10-CM

## 2022-09-29 PROCEDURE — 99213 OFFICE O/P EST LOW 20 MIN: CPT | Performed by: NURSE PRACTITIONER

## 2022-09-29 RX ORDER — METHYLPREDNISOLONE 4 MG/1
TABLET ORAL
Qty: 21 TABLET | Refills: 0 | Status: SHIPPED | OUTPATIENT
Start: 2022-09-29 | End: 2023-01-06

## 2022-09-29 RX ORDER — FLUTICASONE PROPIONATE 50 MCG
2 SPRAY, SUSPENSION (ML) NASAL DAILY
Qty: 16 ML | Refills: 0 | Status: SHIPPED | OUTPATIENT
Start: 2022-09-29 | End: 2023-01-06

## 2022-09-29 RX ORDER — PSEUDOEPHEDRINE HCL 120 MG/1
120 TABLET, FILM COATED, EXTENDED RELEASE ORAL EVERY 12 HOURS
Qty: 20 TABLET | Refills: 0 | Status: SHIPPED | OUTPATIENT
Start: 2022-09-29 | End: 2023-01-06

## 2022-09-29 NOTE — PROGRESS NOTES
CHIEF COMPLAINT  Chief Complaint   Patient presents with   • Earache   • Nasal Congestion   • Sinus Problem         HPI  Linda Li is a 32 y.o. female  presents with complaint of sinus congestion, sore throat  and left ear pain that has been present for 2 days. She reports no fever. Her symptoms have been present for 2 days. She is taking  mg for pain. She reports no fever or nasal congestion. She reports no h/o seasonal allergies or strep throat. She has no cough, shortness of breath or wheezing. She has taken a home covid 19 test which was negative. She declines a PCR Test today.     Review of Systems   Constitutional: Negative.    HENT: Positive for congestion, ear pain (left ear pain) and sinus pressure. Negative for sore throat.    Respiratory: Negative.    Cardiovascular: Negative.    Gastrointestinal: Negative.    Allergic/Immunologic: Negative for environmental allergies.   Neurological: Positive for headaches.   Hematological: Negative.    Psychiatric/Behavioral: Negative.        Past Medical History:   Diagnosis Date   • Anxiety    • Depression    • Dyspnea    • Essential hypertension    • Migraine    • Migraines    • Tachycardia        Family History   Problem Relation Age of Onset   • Breast cancer Mother    • Leukemia Father         CLL   • Heart disease Maternal Grandmother    • Diabetes Maternal Grandmother    • Heart disease Maternal Grandfather    • Stroke Maternal Grandfather    • Diabetes Maternal Grandfather        Social History     Socioeconomic History   • Marital status: Single   • Number of children: 0   • Highest education level: Master's degree (e.g., MA, MS, Cierra, MEd, MSW, CHANDANA)   Tobacco Use   • Smoking status: Never Smoker   • Smokeless tobacco: Never Used   Vaping Use   • Vaping Use: Never used   Substance and Sexual Activity   • Alcohol use: Not Currently     Comment: social/caffeine use    • Drug use: No   • Sexual activity: Defer         There were no vitals taken for  this visit.    PHYSICAL EXAM  Physical Exam   Constitutional: She is oriented to person, place, and time. She appears well-developed and well-nourished. She does not have a sickly appearance. She does not appear ill. No distress.   HENT:   Head: Normocephalic and atraumatic.   Right Ear: External ear normal. No drainage.   Left Ear: External ear normal. No drainage.   Nose: Mucosal edema present. Right sinus exhibits no maxillary sinus tenderness and no frontal sinus tenderness. Left sinus exhibits no maxillary sinus tenderness and no frontal sinus tenderness.   Mouth/Throat: Mouth/Lips are normal.No oropharyngeal exudate.   Oral pharynx is erythremic without swelling or exudate    Pulmonary/Chest: Effort normal.  No respiratory distress.  Lymphadenopathy:     She has no cervical adenopathy.   Neurological: She is alert and oriented to person, place, and time.   Psychiatric: She has a normal mood and affect.   Vitals reviewed.      Results for orders placed or performed in visit on 01/19/21   Adult Transthoracic Echo Complete w/ Color, Spectral and Contrast if Necessary Per Protocol   Result Value Ref Range    BSA 2.4 m^2    IVSd 1.0 cm    LVIDd 4.4 cm    LVIDs 3.1 cm    LVPWd 1.0 cm    IVS/LVPW 1.0     FS 29.5 %    EDV(Teich) 87.7 ml    ESV(Teich) 37.9 ml    EF(Teich) 56.8 %    EF(cubed) 65.0 %    LV mass(C)d 147.8 grams    LV mass(C)dI 62.7 grams/m^2    SV(Teich) 49.8 ml    SI(Teich) 21.1 ml/m^2    SV(cubed) 55.4 ml    SI(cubed) 23.5 ml/m^2    Ao root diam 3.0 cm    Ao root area 7.1 cm^2    ACS 2.2 cm    asc Aorta Diam 2.5 cm    LVOT diam 2.0 cm    LVOT area 3.1 cm^2    LVOT area(traced) 3.1 cm^2    RVOT diam 2.1 cm    RVOT area 3.5 cm^2    LVLd ap4 7.6 cm    EDV(MOD-sp4) 55.0 ml    LVLs ap4 6.0 cm    ESV(MOD-sp4) 25.0 ml    EF(MOD-sp4) 54.5 %    LVLd ap2 7.8 cm    EDV(MOD-sp2) 76.0 ml    LVLs ap2 6.1 cm    ESV(MOD-sp2) 28.0 ml    EF(MOD-sp2) 63.2 %    SV(MOD-sp4) 30.0 ml    SI(MOD-sp4) 12.7 ml/m^2    SV(MOD-sp2)  48.0 ml    SI(MOD-sp2) 20.4 ml/m^2    Ao root area (BSA corrected) 1.3     LV Alonzo Vol (BSA corrected) 23.3 ml/m^2    LV Sys Vol (BSA corrected) 10.6 ml/m^2    TAPSE (>1.6) 2.1 cm    EF(MOD-bp) 59.6 %    MV E max derrick 70.4 cm/sec    MV A max derrick 50.2 cm/sec    MV E/A 1.4     MV V2 max 71.5 cm/sec    MV max PG 2.0 mmHg    MV V2 mean 48.6 cm/sec    MV mean PG 1.0 mmHg    MV V2 VTI 18.0 cm    MVA(VTI) 4.0 cm^2    MV P1/2t max derrick 40.4 cm/sec    MV P1/2t 53.9 msec    MVA(P1/2t) 4.1 cm^2    MV dec slope 219.2 cm/sec^2    MV dec time 0.17 sec    Ao pk derrick 158.0 cm/sec    Ao max PG 10.0 mmHg    Ao max PG (full) 3.9 mmHg    Ao V2 mean 97.6 cm/sec    Ao mean PG 5.0 mmHg    Ao mean PG (full) 2.0 mmHg    Ao V2 VTI 25.7 cm    MARIA T(I,A) 2.8 cm^2    MARIA T(I,D) 2.8 cm^2    MARIA T(V,A) 2.4 cm^2    MARIA T(V,D) 2.4 cm^2    LV V1 max PG 6.1 mmHg    LV V1 mean PG 3.0 mmHg    LV V1 max 123.0 cm/sec    LV V1 mean 85.4 cm/sec    LV V1 VTI 22.7 cm    SV(Ao) 181.7 ml    SI(Ao) 77.0 ml/m^2    SV(LVOT) 71.3 ml    SV(RVOT) 49.2 ml    SI(LVOT) 30.2 ml/m^2    PA V2 max 118.0 cm/sec    PA max PG 5.6 mmHg    PA max PG (full) 2.8 mmHg    BH CV ECHO ASHLEIGH - PVA(V,A) 2.5 cm^2    BH CV ECHO ASHLEIGH - PVA(V,D) 2.5 cm^2    PA acc time 0.17 sec    RV V1 max PG 2.8 mmHg    RV V1 mean PG 1.0 mmHg    RV V1 max 83.6 cm/sec    RV V1 mean 54.9 cm/sec    RV V1 VTI 14.2 cm    TR max derrick 217.0 cm/sec    PA pr(Accel) 3.0 mmHg    Qp/Qs 0.69     MVA P1/2T LCG 5.4 cm^2    RV Base 3.1 cm    RV Length 6.5 cm    Lat Peak E' Derrick 16.8 cm/sec    Med Peak E' Derrick 10.7 cm/sec    RV S' 12.6 cm/sec     CV ECHO AHSLEIGH - BZI_BMI 34.4 kilograms/m^2     CV ECHO ASHLEIGH - BSA([facility]) 2.5 m^2     CV ECHO ASHLEIGH - BZI_METRIC_WEIGHT 115.2 kg     CV ECHO ASHLEIGH - BZI_METRIC_HEIGHT 182.9 cm    Avg E/e' ratio 5.12     Target HR (85%) 163 bpm    Max. Pred. HR (100%) 192 bpm    Sinus 2.6 cm    STJ 2.6 cm    Ascending aorta 2.4 cm    Aortic arch 2.8 cm    LA ESV Index (BP) 20.0 mL/m2       Diagnoses and  all orders for this visit:    1. Upper respiratory tract infection, unspecified type (Primary)  -     pseudoephedrine (Sudafed 12 Hour) 120 MG 12 hr tablet; Take 1 tablet by mouth Every 12 (Twelve) Hours.  Dispense: 20 tablet; Refill: 0  -     fluticasone (Flonase) 50 MCG/ACT nasal spray; 2 sprays into the nostril(s) as directed by provider Daily.  Dispense: 16 mL; Refill: 0    2. Left ear pain  -     pseudoephedrine (Sudafed 12 Hour) 120 MG 12 hr tablet; Take 1 tablet by mouth Every 12 (Twelve) Hours.  Dispense: 20 tablet; Refill: 0  -     methylPREDNISolone (MEDROL) 4 MG dose pack; Take as directed on package instructions.  Dispense: 21 tablet; Refill: 0    Tylenol 650 mg po every 6 hours prn pain.   Warm heat to left ear prn pain.   Warm salt water gargles prn sore throat.   Increase fluids and rest.   Warm tea with honey and lemon prn sore throat.   Follow up prn worsening s/s or if no improvement in 5-7 days.       The use of a video visit has been reviewed with the patient and verbal informd consent has een obtained. Myself and Linda Li participated in this visit. The patient is located in [address] [place] [address]. I am located in [place], [place]. Mychart and Zoom were utilized. I spent 15  minutes in the patient's chart for this visit.           Rose Mary Nolan, APRN  09/29/2022  10:27 EDT

## 2023-01-06 ENCOUNTER — APPOINTMENT (OUTPATIENT)
Dept: GENERAL RADIOLOGY | Facility: HOSPITAL | Age: 33
End: 2023-01-06
Payer: COMMERCIAL

## 2023-01-06 ENCOUNTER — APPOINTMENT (OUTPATIENT)
Dept: CT IMAGING | Facility: HOSPITAL | Age: 33
End: 2023-01-06
Payer: COMMERCIAL

## 2023-01-06 ENCOUNTER — HOSPITAL ENCOUNTER (EMERGENCY)
Facility: HOSPITAL | Age: 33
Discharge: HOME | End: 2023-01-06
Attending: EMERGENCY MEDICINE | Admitting: EMERGENCY MEDICINE
Payer: COMMERCIAL

## 2023-01-06 VITALS
SYSTOLIC BLOOD PRESSURE: 149 MMHG | DIASTOLIC BLOOD PRESSURE: 94 MMHG | HEART RATE: 89 BPM | OXYGEN SATURATION: 100 % | WEIGHT: 255 LBS | TEMPERATURE: 97.4 F | RESPIRATION RATE: 17 BRPM | BODY MASS INDEX: 34.54 KG/M2 | HEIGHT: 72 IN

## 2023-01-06 DIAGNOSIS — U07.1 COVID-19: Primary | ICD-10-CM

## 2023-01-06 DIAGNOSIS — R07.9 CHEST PAIN, UNSPECIFIED TYPE: ICD-10-CM

## 2023-01-06 LAB
ALBUMIN SERPL-MCNC: 4.8 G/DL (ref 3.5–5.2)
ALBUMIN/GLOB SERPL: 1.8 G/DL
ALP SERPL-CCNC: 69 U/L (ref 39–117)
ALT SERPL W P-5'-P-CCNC: 28 U/L (ref 1–33)
ANION GAP SERPL CALCULATED.3IONS-SCNC: 10 MMOL/L (ref 5–15)
AST SERPL-CCNC: 38 U/L (ref 1–32)
BASOPHILS # BLD AUTO: 0.02 10*3/MM3 (ref 0–0.2)
BASOPHILS NFR BLD AUTO: 0.3 % (ref 0–1.5)
BILIRUB SERPL-MCNC: 0.6 MG/DL (ref 0–1.2)
BUN SERPL-MCNC: 12 MG/DL (ref 6–20)
BUN/CREAT SERPL: 13.6 (ref 7–25)
CALCIUM SPEC-SCNC: 9.4 MG/DL (ref 8.6–10.5)
CHLORIDE SERPL-SCNC: 102 MMOL/L (ref 98–107)
CO2 SERPL-SCNC: 27 MMOL/L (ref 22–29)
CREAT SERPL-MCNC: 0.88 MG/DL (ref 0.57–1)
D DIMER PPP FEU-MCNC: 0.68 MCGFEU/ML (ref 0–0.5)
DEPRECATED RDW RBC AUTO: 38 FL (ref 37–54)
EGFRCR SERPLBLD CKD-EPI 2021: 90.8 ML/MIN/1.73
EOSINOPHIL # BLD AUTO: 0.03 10*3/MM3 (ref 0–0.4)
EOSINOPHIL NFR BLD AUTO: 0.5 % (ref 0.3–6.2)
ERYTHROCYTE [DISTWIDTH] IN BLOOD BY AUTOMATED COUNT: 13.2 % (ref 12.3–15.4)
GLOBULIN UR ELPH-MCNC: 2.7 GM/DL
GLUCOSE SERPL-MCNC: 83 MG/DL (ref 65–99)
HCG SERPL QL: NEGATIVE
HCT VFR BLD AUTO: 48.7 % (ref 34–46.6)
HGB BLD-MCNC: 15.4 G/DL (ref 12–15.9)
IMM GRANULOCYTES # BLD AUTO: 0.01 10*3/MM3 (ref 0–0.05)
IMM GRANULOCYTES NFR BLD AUTO: 0.2 % (ref 0–0.5)
LYMPHOCYTES # BLD AUTO: 2.06 10*3/MM3 (ref 0.7–3.1)
LYMPHOCYTES NFR BLD AUTO: 33.8 % (ref 19.6–45.3)
MCH RBC QN AUTO: 25.4 PG (ref 26.6–33)
MCHC RBC AUTO-ENTMCNC: 31.6 G/DL (ref 31.5–35.7)
MCV RBC AUTO: 80.2 FL (ref 79–97)
MONOCYTES # BLD AUTO: 0.37 10*3/MM3 (ref 0.1–0.9)
MONOCYTES NFR BLD AUTO: 6.1 % (ref 5–12)
NEUTROPHILS NFR BLD AUTO: 3.6 10*3/MM3 (ref 1.7–7)
NEUTROPHILS NFR BLD AUTO: 59.1 % (ref 42.7–76)
NRBC BLD AUTO-RTO: 0 /100 WBC (ref 0–0.2)
PLATELET # BLD AUTO: 262 10*3/MM3 (ref 140–450)
PMV BLD AUTO: 10.2 FL (ref 6–12)
POTASSIUM SERPL-SCNC: 4.3 MMOL/L (ref 3.5–5.2)
PROT SERPL-MCNC: 7.5 G/DL (ref 6–8.5)
QT INTERVAL: 348 MS
RBC # BLD AUTO: 6.07 10*6/MM3 (ref 3.77–5.28)
SODIUM SERPL-SCNC: 139 MMOL/L (ref 136–145)
WBC NRBC COR # BLD: 6.09 10*3/MM3 (ref 3.4–10.8)

## 2023-01-06 PROCEDURE — 71275 CT ANGIOGRAPHY CHEST: CPT

## 2023-01-06 PROCEDURE — 93010 ELECTROCARDIOGRAM REPORT: CPT | Performed by: INTERNAL MEDICINE

## 2023-01-06 PROCEDURE — 0 IOPAMIDOL PER 1 ML: Performed by: EMERGENCY MEDICINE

## 2023-01-06 PROCEDURE — 80053 COMPREHEN METABOLIC PANEL: CPT | Performed by: PHYSICIAN ASSISTANT

## 2023-01-06 PROCEDURE — 85025 COMPLETE CBC W/AUTO DIFF WBC: CPT | Performed by: PHYSICIAN ASSISTANT

## 2023-01-06 PROCEDURE — 93005 ELECTROCARDIOGRAM TRACING: CPT | Performed by: EMERGENCY MEDICINE

## 2023-01-06 PROCEDURE — 85379 FIBRIN DEGRADATION QUANT: CPT | Performed by: PHYSICIAN ASSISTANT

## 2023-01-06 PROCEDURE — 93005 ELECTROCARDIOGRAM TRACING: CPT

## 2023-01-06 PROCEDURE — 84703 CHORIONIC GONADOTROPIN ASSAY: CPT | Performed by: PHYSICIAN ASSISTANT

## 2023-01-06 PROCEDURE — 99284 EMERGENCY DEPT VISIT MOD MDM: CPT

## 2023-01-06 PROCEDURE — 71046 X-RAY EXAM CHEST 2 VIEWS: CPT

## 2023-01-06 RX ORDER — SODIUM CHLORIDE 0.9 % (FLUSH) 0.9 %
10 SYRINGE (ML) INJECTION AS NEEDED
Status: DISCONTINUED | OUTPATIENT
Start: 2023-01-06 | End: 2023-01-06 | Stop reason: HOSPADM

## 2023-01-06 RX ADMIN — SODIUM CHLORIDE 1000 ML: 9 INJECTION, SOLUTION INTRAVENOUS at 12:32

## 2023-01-06 RX ADMIN — IOPAMIDOL 100 ML: 755 INJECTION, SOLUTION INTRAVENOUS at 14:34

## 2023-01-06 NOTE — ED PROVIDER NOTES
Pt presents to the ED c/o  mild intermittent chest pains worse with deep breathing, diagnosed with COVID on Monday.  Has had nausea and vomiting as well but is taking Zofran.  Has had some mild shortness of breath.     On exam,   General: No acute distress, nontoxic  HEENT: Mucous membranes moist, atraumatic, EOMI  Neck: Full ROM  Pulm: Symmetric chest rise, nonlabored, lungs CTAB  Cardiovascular: Regular rate and rhythm, intact distal pulses  GI: Soft, nontender, nondistended, no rebound, no guarding, bowel sounds present  MSK: Full ROM, no deformity  Skin: Warm, dry  Neuro: Awake, alert, oriented x 4, GCS 15, moving all extremities, no focal deficits  Psych: Calm, cooperative      N95, protective eye goggles, and gloves used during this encounter. Patient in surgical mask.    EKG    1008  Sinus rhythm with rate of 96  Normal axis  Normal intervals  Nonspecific lateral T wave abnormalities  No STEMI    Interpreted Contemporaneously by me, independently viewed  No emergent changes compared to January 2021      Plan:   ED Course as of 01/06/23 1518   Fri Jan 06, 2023   1140 Known covid-19. Chest pain, worse with inspiration and tachycardia. Chest xray clear. Unable to PERC out. Will obtain basic labs including CBC, CMP, and a d-dimer and administer IV fluids. [DC]   1141 Heart Rate: 116 [DC]   1141 Temp: 97.4 °F (36.3 °C) [DC]   1141 BP: 150/93 [DC]   1324 D-Dimer, Quant(!): 0.68  Will obtain CTA chest to evaluate for pulmonary embolism. [DC]   1341 Patient updated on plan for CT chest.  She is agreeable with plan. [DC]   1517 CT Angiogram Chest Pulmonary Embolism  Reviewed in PACS. My interpretation is no central pulmonary embolus or focal infiltrate. [DC]      ED Course User Index  [DC] Nerissa Johnson PA     Reassuring work-up today, no evidence of PE or overt significant pneumonia.  Safe for discharge with continued supportive care.  Return to work protocols per her employer, ED return for worsening symptoms as  needed.     Attestation:  The ASHLYN and I have discussed this patient's history, physical exam, and treatment plan.  I have reviewed the documentation and personally had a face to face interaction with the patient. I affirm the documentation and agree with the treatment and plan.  The attached note describes my personal findings.          Mike Fung MD  01/06/23 3809

## 2023-01-06 NOTE — ED TRIAGE NOTES
Cp started Monday when she tested + for covid.  It worsens c deep breath    Patient was placed in face mask during first look triage.  Patient was wearing a face mask throughout encounter.  I wore personal protective equipment throughout the encounter.  Hand hygiene was performed before and after patient encounter.

## 2023-01-06 NOTE — ED PROVIDER NOTES
EMERGENCY DEPARTMENT ENCOUNTER    Room Number:  B05/05  Date seen:  1/6/2023  PCP: Ana Juarez APRN  Historian: Patient      HPI:  Chief Complaint: Chest pain  A complete HPI/ROS/PMH/PSH/SH/FH are unobtainable due to: None  Context: Linda Li is a 32 y.o. female who presents to the ED c/o chest pain worse with inspiration that has been persistent over the past several days.  Patient initially developed some congestion and sore throat but has also had nausea vomiting and generalized fatigue since Monday 1/2.  She tested positive for COVID-19.  She denies unilateral leg swelling.  She does report some mild shortness of breath.  She does not have a history of DVT or PE.            PAST MEDICAL HISTORY  Active Ambulatory Problems     Diagnosis Date Noted   • Hemorrhoids 12/09/2020   • Palpitations 01/19/2021   • Dyspnea on exertion 01/19/2021   • Precordial pain 01/19/2021     Resolved Ambulatory Problems     Diagnosis Date Noted   • No Resolved Ambulatory Problems     Past Medical History:   Diagnosis Date   • Anxiety    • Depression    • Dyspnea    • Essential hypertension    • Migraine    • Migraines    • Tachycardia          PAST SURGICAL HISTORY  Past Surgical History:   Procedure Laterality Date   • HEMORRHOIDECTOMY N/A 12/10/2020    Procedure: excision of thrombosed hemorrhoid;  Surgeon: Geri Anglin MD;  [facility] [facility] [place] [facility] [place];  Service: General;  Laterality: N/A;   • WISDOM TOOTH EXTRACTION           FAMILY HISTORY  Family History   Problem Relation Age of Onset   • Breast cancer Mother    • Leukemia Father         CLL   • Heart disease Maternal Grandmother    • Diabetes Maternal Grandmother    • Heart disease Maternal Grandfather    • Stroke Maternal Grandfather    • Diabetes Maternal Grandfather          SOCIAL HISTORY  Social History     Socioeconomic History   • Marital status: Single   • Number of children: 0   • Highest education level: Master's degree (e.g., MA, MS, Cierra, MEd, MSW, CHANDANA)    Tobacco Use   • Smoking status: Never   • Smokeless tobacco: Never   Vaping Use   • Vaping Use: Never used   Substance and Sexual Activity   • Alcohol use: Not Currently     Comment: social/caffeine use    • Drug use: No   • Sexual activity: Defer         ALLERGIES  Patient has no known allergies.        REVIEW OF SYSTEMS  Review of Systems   Constitutional: Positive for chills and fatigue.   HENT: Positive for congestion.    Respiratory: Positive for cough and shortness of breath.    Cardiovascular: Positive for chest pain.   Gastrointestinal: Positive for nausea and vomiting. Negative for abdominal pain.   Genitourinary: Negative for dysuria.   Neurological: Positive for light-headedness.          PHYSICAL EXAM  ED Triage Vitals   Temp Heart Rate Resp BP SpO2   01/06/23 1004 01/06/23 1004 01/06/23 1004 01/06/23 1011 01/06/23 1004   97.4 °F (36.3 °C) 116 18 150/93 96 %      Temp src Heart Rate Source Patient Position BP Location FiO2 (%)   -- 01/06/23 1257 01/06/23 1257 01/06/23 1257 --    Monitor Sitting Right arm        Physical Exam      GENERAL: no acute distress, well developed  HENT: nares patent  EYES: no scleral icterus  CV: regular rhythm, tachycardic rate  RESPIRATORY: normal effort, CTAB   ABDOMEN: soft, nontender  MUSCULOSKELETAL: no deformity  NEURO: alert, moves all extremities, follows commands  PSYCH:  calm, cooperative  SKIN: warm, dry    Vital signs and nursing notes reviewed.          LAB RESULTS  Recent Results (from the past 24 hour(s))   ECG 12 Lead Chest Pain    Collection Time: 01/06/23 10:08 AM   Result Value Ref Range    QT Interval 348 ms   Comprehensive Metabolic Panel    Collection Time: 01/06/23 12:30 PM    Specimen: Blood   Result Value Ref Range    Glucose 83 65 - 99 mg/dL    BUN 12 6 - 20 mg/dL    Creatinine 0.88 0.57 - 1.00 mg/dL    Sodium 139 136 - 145 mmol/L    Potassium 4.3 3.5 - 5.2 mmol/L    Chloride 102 98 - 107 mmol/L    CO2 27.0 22.0 - 29.0 mmol/L    Calcium 9.4 8.6 -  10.5 mg/dL    Total Protein 7.5 6.0 - 8.5 g/dL    Albumin 4.8 3.5 - 5.2 g/dL    ALT (SGPT) 28 1 - 33 U/L    AST (SGOT) 38 (H) 1 - 32 U/L    Alkaline Phosphatase 69 39 - 117 U/L    Total Bilirubin 0.6 0.0 - 1.2 mg/dL    Globulin 2.7 gm/dL    A/G Ratio 1.8 g/dL    BUN/Creatinine Ratio 13.6 7.0 - 25.0    Anion Gap 10.0 5.0 - 15.0 mmol/L    eGFR 90.8 >60.0 mL/min/1.73   hCG, Serum, Qualitative    Collection Time: 01/06/23 12:30 PM    Specimen: Blood   Result Value Ref Range    HCG Qualitative Negative Negative   D-dimer, Quantitative    Collection Time: 01/06/23 12:30 PM    Specimen: Blood   Result Value Ref Range    D-Dimer, Quantitative 0.68 (H) 0.00 - 0.50 MCGFEU/mL   CBC Auto Differential    Collection Time: 01/06/23 12:30 PM    Specimen: Blood   Result Value Ref Range    WBC 6.09 3.40 - 10.80 10*3/mm3    RBC 6.07 (H) 3.77 - 5.28 10*6/mm3    Hemoglobin 15.4 12.0 - 15.9 g/dL    Hematocrit 48.7 (H) 34.0 - 46.6 %    MCV 80.2 79.0 - 97.0 fL    MCH 25.4 (L) 26.6 - 33.0 pg    MCHC 31.6 31.5 - 35.7 g/dL    RDW 13.2 12.3 - 15.4 %    RDW-SD 38.0 37.0 - 54.0 fl    MPV 10.2 6.0 - 12.0 fL    Platelets 262 140 - 450 10*3/mm3    Neutrophil % 59.1 42.7 - 76.0 %    Lymphocyte % 33.8 19.6 - 45.3 %    Monocyte % 6.1 5.0 - 12.0 %    Eosinophil % 0.5 0.3 - 6.2 %    Basophil % 0.3 0.0 - 1.5 %    Immature Grans % 0.2 0.0 - 0.5 %    Neutrophils, Absolute 3.60 1.70 - 7.00 10*3/mm3    Lymphocytes, Absolute 2.06 0.70 - 3.10 10*3/mm3    Monocytes, Absolute 0.37 0.10 - 0.90 10*3/mm3    Eosinophils, Absolute 0.03 0.00 - 0.40 10*3/mm3    Basophils, Absolute 0.02 0.00 - 0.20 10*3/mm3    Immature Grans, Absolute 0.01 0.00 - 0.05 10*3/mm3    nRBC 0.0 0.0 - 0.2 /100 WBC       Ordered the above labs and reviewed the results.        RADIOLOGY  XR Chest 2 View    Result Date: 1/6/2023  TWO-VIEW CHEST  HISTORY: Chest pain.  FINDINGS: The lungs are well-expanded and clear and the heart and hilar structures are normal. There is no acute disease.  This  report was finalized on 1/6/2023 10:55 AM by Dr. Vito Vital M.D.      CT Angiogram Chest Pulmonary Embolism    Result Date: 1/6/2023  CT ANGIOGRAM OF THE CHEST WITH CONTRAST INCLUDING RECONSTRUCTION IMAGES 01/06/2023  HISTORY: Chest pain. Covid. Elevated D-dimer. Possible pulmonary embolus.  Following the intravenous contrast injection CT angiography was performed through the chest. Sagittal, coronal and 3-D reconstruction images were reviewed.  The pulmonary arterial system is well opacified with no evidence of pulmonary embolus.  There is some increased attenuation of the anterior mediastinum likely residual thymus tissue.  No lung masses or significant pulmonary infiltrates are seen. The upper abdomen is unremarkable.      No evidence of pulmonary embolus.  Radiation dose reduction techniques were utilized, including automated exposure control and exposure modulation based on body size.         Ordered the above noted radiological studies. Reviewed by me in PACS.            PROCEDURES  Procedures        MEDICATIONS GIVEN IN ER  Medications   sodium chloride 0.9 % bolus 1,000 mL (0 mL Intravenous Stopped 1/6/23 1448)   iopamidol (ISOVUE-370) 76 % injection 100 mL (100 mL Intravenous Given 1/6/23 1434)                   MEDICAL DECISION MAKING, PROGRESS, and CONSULTS    All labs have been independently reviewed by me.  All radiology studies have been reviewed by me and I have also reviewed the radiology report.   EKG's independently viewed and interpreted by me.  Discussion below represents my analysis of pertinent findings related to patient's condition, differential diagnosis, treatment plan and final disposition.      Additional sources:    - External (non-ED) record review: Reviewed urgent care visit from 1/2/2023.  Discharged with azithromycin, Paxlovid prescription, and Zofran.      Orders placed during this visit:  Orders Placed This Encounter   Procedures   • XR Chest 2 View   • CT Angiogram Chest  Pulmonary Embolism   • Comprehensive Metabolic Panel   • hCG, Serum, Qualitative   • D-dimer, Quantitative   • CBC Auto Differential   • ECG 12 Lead Chest Pain   • CBC & Differential           Differential diagnosis:    My differential diagnosis for dyspnea includes but is not limited to:    Asthma, COPD, pneumonia, pulmonary embolism, acute respiratory distress syndrome, pneumothorax, hemothorax, pleural effusion, pulmonary fibrosis, congestive heart failure, myocardial infarction, DKA, uremia, acidosis, sepsis, anemia, drug related, hyperventilation, CNS disease, inhalation exposure, airway obstruction, aspiration, electrolyte abnormalities, myasthenia gravis, panic attack, anaphylaxis        Independent interpretation of labs, radiology studies, and discussions with consultants:  ED Course as of 01/06/23 2012 Fri Jan 06, 2023   1140 Known covid-19. Chest pain, worse with inspiration and tachycardia. Chest xray clear. Unable to PERC out. Will obtain basic labs including CBC, CMP, and a d-dimer and administer IV fluids. [DC]   1141 Heart Rate: 116 [DC]   1141 Temp: 97.4 °F (36.3 °C) [DC]   1141 BP: 150/93 [DC]   1324 D-Dimer, Quant(!): 0.68  Will obtain CTA chest to evaluate for pulmonary embolism. [DC]   1341 Patient updated on plan for CT chest.  She is agreeable with plan. [DC]   1517 CT Angiogram Chest Pulmonary Embolism  Reviewed in PACS. My interpretation is no central pulmonary embolus or focal infiltrate. [DC]      ED Course User Index  [DC] Nerissa Johnson PA             Patient was placed in face mask in first look. Patient was wearing facemask when I entered the room and throughout our encounter. I wore full protective equipment throughout this patient encounter including a face mask, and gloves. Hand hygiene was performed before donning protective equipment and after removal when leaving the room.      DIAGNOSIS  Final diagnoses:   COVID-19   Chest pain, unspecified type          DISPOSITION  Discharge            Latest Documented Vital Signs:  As of 20:12 EST  BP- 149/94 HR- 89 Temp- 97.4 °F (36.3 °C) O2 sat- 100%              --    Please note that portions of this were completed with a voice recognition program.       Note Disclaimer: At [facility], we believe that sharing information builds trust and better relationships. You are receiving this note because you are receiving care at [facility] or recently visited. It is possible you will see health information before a provider has talked with you about it. This kind of information can be easy to misunderstand. To help you fully understand what it means for your health, we urge you to discuss this note with your provider.           Nerissa Johnson PA  01/06/23 2012

## 2023-01-18 ENCOUNTER — OFFICE VISIT (OUTPATIENT)
Dept: SURGERY | Facility: CLINIC | Age: 33
End: 2023-01-18
Payer: COMMERCIAL

## 2023-01-18 VITALS — BODY MASS INDEX: 34.59 KG/M2 | WEIGHT: 255.4 LBS | HEIGHT: 72 IN

## 2023-01-18 DIAGNOSIS — K64.5 PERIANAL VENOUS THROMBOSIS: Primary | ICD-10-CM

## 2023-01-18 DIAGNOSIS — K62.5 RECTAL BLEEDING: ICD-10-CM

## 2023-01-18 PROCEDURE — 99213 OFFICE O/P EST LOW 20 MIN: CPT | Performed by: SURGERY

## 2023-01-18 RX ORDER — HYDROCORTISONE 25 MG/G
CREAM TOPICAL
Qty: 28 G | Refills: 1 | Status: SHIPPED | OUTPATIENT
Start: 2023-01-18 | End: 2023-04-18

## 2023-01-18 NOTE — PROGRESS NOTES
SURGERY  Linda Li   1990 01/18/23    Chief Complaint:  HEMORRHOIDS    HPI    Ms. Li is a very nice 32 y.o. female who underwent resection of large thrombosed hemorrhoids previously, with the not unusual post op difficult course, ultimately getting a fissure.  She developed another over the weekend and is here today to consider resection.  It is a tiny bit better than 2 days ago.    Past Medical History:   Diagnosis Date   • Abnormal ECG 1/6/23    Covid positive.   • Anxiety    • Depression    • Dyspnea    • Essential hypertension    • Fissure, anal Spring 2021   • Migraine    • Migraines    • Rectal bleeding     Only with the hemorrhoids and fissure   • Tachycardia      Past Surgical History:   Procedure Laterality Date   • HEMORRHOIDECTOMY N/A 12/10/2020    Procedure: excision of thrombosed hemorrhoid;  Surgeon: Geri Anglin MD;  [place] [facility] [place] [facility] [place]  Service: General;  Laterality: N/A;   • WISDOM TOOTH EXTRACTION       Family History   Problem Relation Age of Onset   • Breast cancer Mother    • Cancer Mother         Breast Cancer   • Leukemia Father         CLL   • Cancer Father         Leukemia   • Heart disease Maternal Grandmother    • Diabetes Maternal Grandmother    • Heart disease Maternal Grandfather    • Stroke Maternal Grandfather    • Diabetes Maternal Grandfather      Social History     Socioeconomic History   • Marital status: Single   • Number of children: 0   • Highest education level: Master's degree (e.g., MA, MS, Cierra, MEd, MSW, CHANDANA)   Tobacco Use   • Smoking status: Never   • Smokeless tobacco: Never   Vaping Use   • Vaping Use: Never used   Substance and Sexual Activity   • Alcohol use: Yes     Comment: Social   • Drug use: No   • Sexual activity: Yes     Partners: Female     Birth control/protection: Same-sex partner         Current Outpatient Medications:   •  Hydrocortisone, Perianal, (Anusol-HC) 2.5 % rectal cream, Apply rectally 2 times daily, Disp: 30 g, Rfl: 1    No  "Known Allergies    PHYSICAL EXAM:    Ht 182.9 cm (72\")   Wt 116 kg (255 lb 6.4 oz)   BMI 34.64 kg/m²   Body mass index is 34.64 kg/m².    Constitutional: well developed, well nourished, appears  healthy, stated age  Gastrointestinal: abdomen soft, posterior left perianal region with a radially oriented 1 cm thrombosed hemorrhoid, flat, tender, semi soft, not turgig  Neurological: awake and alert, seems to have reasonable capacity for understanding for medical decision making  Psychiatric: appears to have reasonable judgement, pleasant    Reviewed: past op note where resection went to the anal verge but with anesthetic so likely entered more sensate anal canal    IMPRESSION:  · Thrombosed hemorrhoid, beginning to resolve.  · Occasional rectal bleeding.  · History of fissure    PLAN:  · Would defer excising thrombosed hemorrhoids since already resolving.  Recommended sitz baths and wrote anusol HC for perianal itching.  · C scope in 2-3 months.    Geri Anglin MD      In order to provide a more personal and interactive patient experience as well as improve efficiency, this note was started prior to the office visit, including review of past history and pertinent images, surgeries.      "

## 2023-01-19 RX ORDER — SODIUM CHLORIDE 0.9 % (FLUSH) 0.9 %
1-10 SYRINGE (ML) INJECTION AS NEEDED
Status: CANCELLED | OUTPATIENT
Start: 2023-01-19

## 2023-01-19 RX ORDER — SODIUM CHLORIDE 0.9 % (FLUSH) 0.9 %
10 SYRINGE (ML) INJECTION EVERY 12 HOURS SCHEDULED
Status: CANCELLED | OUTPATIENT
Start: 2023-01-19

## 2023-08-09 ENCOUNTER — OFFICE VISIT (OUTPATIENT)
Dept: FAMILY MEDICINE CLINIC | Facility: CLINIC | Age: 33
End: 2023-08-09
Payer: COMMERCIAL

## 2023-08-09 VITALS
OXYGEN SATURATION: 98 % | DIASTOLIC BLOOD PRESSURE: 90 MMHG | HEIGHT: 72 IN | SYSTOLIC BLOOD PRESSURE: 130 MMHG | BODY MASS INDEX: 35.76 KG/M2 | RESPIRATION RATE: 18 BRPM | HEART RATE: 90 BPM | WEIGHT: 264 LBS

## 2023-08-09 DIAGNOSIS — F32.A ANXIETY AND DEPRESSION: Primary | ICD-10-CM

## 2023-08-09 DIAGNOSIS — F41.9 ANXIETY AND DEPRESSION: Primary | ICD-10-CM

## 2023-08-09 DIAGNOSIS — F51.01 PRIMARY INSOMNIA: ICD-10-CM

## 2023-08-09 DIAGNOSIS — R45.851 SUICIDAL IDEATION: ICD-10-CM

## 2023-08-09 DIAGNOSIS — G43.709 CHRONIC MIGRAINE WITHOUT AURA WITHOUT STATUS MIGRAINOSUS, NOT INTRACTABLE: ICD-10-CM

## 2023-08-09 RX ORDER — HYDROXYZINE HYDROCHLORIDE 25 MG/1
25 TABLET, FILM COATED ORAL 3 TIMES DAILY PRN
Qty: 90 TABLET | Refills: 0 | Status: SHIPPED | OUTPATIENT
Start: 2023-08-09 | End: 2023-09-08

## 2023-08-09 NOTE — PATIENT INSTRUCTIONS
Suicidal Feelings: How to Help Yourself  Suicide is when you end your own life. Suicidal ideation includes expressing thoughts about, or a preoccupation with, ending your own life. There are many things you can do to help yourself feel better when struggling with these feelings. Many services and people are available to support you and others who struggle with similar feelings.  If you ever feel like you may hurt yourself or others, or have thoughts about taking your own life, get help right away. To get help:  Go to your nearest emergency department.  Call your local emergency services (031 in the [place]  Call the [facility] and [facility] services helpline (211 in the [place]  Call or text a suicide hotline to speak with a trained counselor. The following suicide hotlines are available in the [place]:  2-250-141-TALK (1-757.296.9469 or 935 in the [place]  8-694-IFCEKTQ (1-550.289.1096).  Text 941523. This is the Crisis Text Line in the [place]  1-605.777.5210. This is a hotline for Welsh speakers.  1-871.507.5634. This is a hotline for TTY users.  3-547-0-U-EB (1-221.890.5658). This is a hotline for lesbian, jordan, bisexual, transgender, or questioning youth.  For a list of hotlines in [place], visit suicide.org/hotlines/international/avpvnd-ijdlslt-pvsldgzl.html  Contact a crisis center or a local suicide prevention center. To find a crisis center or suicide prevention center:  Call your local hospital, clinic, community service organization, mental health center, social service provider, or health department. Ask for help with connecting to a crisis center.  For a list of crisis centers in the [place], visit: suicidepreventionlifeline.org  For a list of crisis centers in [place], visit: suicideprevention.ca  How to help yourself feel better    Promise yourself that you will not do anything bad or extreme when you have suicidal feelings. Remember the times you have felt hopeful.  Many people have  gotten through suicidal thoughts and feelings, and you can too.  If you have had these feelings before, remind yourself that you can get through them again.  Let family, friends, teachers, or counselors know how you are feeling. Do not separate yourself from those who care about you and want to help you.  Talk with someone every day, even if you do not feel like talking to anyone or being with other people.  Face-to-face conversation is best to help them understand your feelings.  Contact a mental health care provider and work with this person regularly.  Make a safety plan that you can follow during a crisis.  Include phone numbers of suicide prevention hotlines, mental health professionals, and trusted friends and family members you can call during an emergency.  Save these numbers on your phone.  If you are thinking of taking a lot of medicine, give your medicine to someone who can give it to you as prescribed.  If you are on antidepressants and are concerned you will overdose, tell your health care provider so that he or she can give you safer medicines.  Try to stick to your routines and follow a schedule every day. Make self-care a priority.  Make a list of realistic goals, and cross them off when you achieve them. Accomplishments can give you a sense of worth.  Wait until you are feeling better before doing things that you find difficult or unpleasant.  Do things that you have always enjoyed to take your mind off your feelings.  Try reading a book, or listening to or playing music.  Spending time outside, in nature, may help you feel better.  Follow these instructions at home:    Visit your primary health care provider every year for a physical and a mental health checkup.  Take over-the-counter and prescription medicines only as told by your health care provider.  Ask your health care provider about the possible side effects of any medicines you are taking.  Ask your health care provider about whether  suicidal ideation is a possible side effect of any of your medicines.  Learn about suicidal ideation and what increases the risk for the development of suicidal thoughts.  Eat a well-balanced diet, and eat regular meals.  Get plenty of rest.  Exercise if you are able. Just 30 minutes of exercise each day can help you feel better.  Keep your living space well lit.  Do not use alcohol or drugs. Remove these substances from your home.  General recommendations  Remove weapons, poisons, knives, and other deadly items from your home.  Work with a mental health care provider as needed.  When you are feeling well, write yourself a letter with tips and support that you can read when you are not feeling well.  Remember that life's difficulties can be sorted out with help. Conditions can be treated, and you can learn behaviors and ways of thinking that will help you.  Work with your health care provider or counselor to learn ways of coping with your thoughts and feelings.  Where to find more information  National Suicide Prevention Lifeline: www.suicidepreventionlifeline.org  Hopeline: www.hopeline.[place]  American Foundation for Suicide Prevention: www.afsp.org  The Theo Project (for lesbian, jordan, bisexual, transgender, or questioning youth): www.thetrevorproject.org  National Bearden of Mental Health: www.nimh.nih.gov/health/topics/suicide-prevention  Suicide Prevention Resources: afsp.org/suicide-prevention-resources  Contact a health care provider if:  You feel as though you are a burden to others.  You feel agitated, angry, vengeful, or have extreme mood swings.  You have withdrawn from family and friends.  You are frequently using drugs or alcohol.  Get help right away if:  You are talking about suicide or wishing to die.  You start making plans for how to commit suicide.  You feel that you have no reason to live.  You start making plans for putting your affairs in order, saying goodbye, or giving your possessions  away.  You feel guilt, shame, or unbearable pain, and it seems like there is no way out.  You are engaging in risky behaviors that could lead to death.  If you have any of these thoughts or symptoms, get help right away:  Go to your nearest emergency department or crisis center.  Call emergency services (911 in the [place]  Call or text a suicide crisis helpline.  Summary  Suicide is when you take your own life. Suicidal feelings are thoughts about ending your own life.  Promise yourself that you will not do anything bad or extreme when you have suicidal feelings.  Let family, friends, teachers, or counselors know how you are feeling.  Get help right away if you start making plans for how to commit suicide.  This information is not intended to replace advice given to you by your health care provider. Make sure you discuss any questions you have with your health care provider.  Document Revised: 07/14/2022 Document Reviewed: 04/28/2022  [employer] Patient Education c 2023 [employer]

## 2023-08-09 NOTE — PROGRESS NOTES
Subjective   Linda Li is a 33 y.o. female.     Anxiety  Symptoms include depressed mood, nervous/anxious behavior and suicidal ideas. Patient reports no chest pain, nausea, palpitations or shortness of breath.     Her past medical history is significant for depression.   DepressionPatient presents with the following symptoms: depressed mood, nervousness/anxiety and suicidal ideas.  Patient is not experiencing: palpitations, shortness of breath, weight gain and weight loss.         Ne pt here to estab PCP. acute concerns     Chronic anxiety and depression x years. Much worse since getting a divorce. Parents are siding with her ex . Patient is newly coming out as homosexual. She has done some joint therapy with her Dad. She has limited contact with her parents. She is active in therapy herself q3 weeks. Stress with dog with torn ACL and concerns for cost of treatment. She has prev been on meds in the past and was on lexapro and buspirone combo. PHQ-2 Depression Screening  Little interest or pleasure in doing things? 0-->not at all   Feeling down, depressed, or hopeless? 3-->nearly every day   PHQ-2 Total Score 20   Denies active plans for suicide. No weapons. Very tearful. Reports she has suicidal thoughts.  Hx of severe anxiety, OCP tendencies , checking and not able to cope with being wrong or failing.   Poor sleep 3-4 hrs/night. Very stressed. Thinks missing work will be worse for her, rarely takes off. She does have one stable best friend and support. Drinking some coffee. Limited water. Eating 3 meals/day.     Chronic Migraines x years. Rare aura, mostly without, Pt is not on OCPs. Prev on imitrex but rarely uses or needs this. Take ibuprofen or tylenol as needs or excedrin migraine.      Acute L ear cracking and popping, no pain, fever, cold sx or acute allergies.    The following portions of the patient's history were reviewed and updated as appropriate: allergies, current medications, past family  history, past medical history, past social history, past surgical history and problem list.    Review of Systems   Constitutional:  Positive for fatigue. Negative for activity change, unexpected weight gain and unexpected weight loss.   HENT:  Negative for congestion.         L ear cracking     Respiratory:  Negative for cough, chest tightness, shortness of breath and wheezing.    Cardiovascular:  Negative for chest pain, palpitations and leg swelling.   Gastrointestinal:  Negative for abdominal pain, constipation, diarrhea, nausea, vomiting and GERD.   Genitourinary:  Negative for dysuria and frequency.   Musculoskeletal:  Negative for arthralgias.   Allergic/Immunologic: Positive for environmental allergies.   Neurological:  Positive for headache.   Hematological:  Does not bruise/bleed easily.   Psychiatric/Behavioral:  Positive for dysphoric mood, sleep disturbance, suicidal ideas, depressed mood and stress. The patient is nervous/anxious.      Objective   Physical Exam  Vitals reviewed.   Constitutional:       Appearance: Normal appearance. She is obese.   HENT:      Right Ear: Tympanic membrane normal.      Left Ear: Tympanic membrane normal.      Mouth/Throat:      Mouth: Mucous membranes are moist.   Cardiovascular:      Rate and Rhythm: Regular rhythm. Tachycardia present.   Pulmonary:      Effort: Pulmonary effort is normal.      Breath sounds: Normal breath sounds.   Skin:     General: Skin is warm.   Neurological:      General: No focal deficit present.      Mental Status: She is alert.   Psychiatric:         Mood and Affect: Mood is anxious and depressed. Affect is tearful.         Thought Content: Thought content is not delusional. Thought content includes suicidal ideation. Thought content does not include homicidal ideation. Thought content does not include homicidal or suicidal plan.         Cognition and Memory: Cognition is not impaired.       Vitals:    08/09/23 0945   BP: 130/90   Pulse: 90    Resp:    SpO2:      Body mass index is 35.8 kg/mý.    Procedures    Assessment & Plan   Problems Addressed this Visit          Neuro    Migraine headache    Relevant Medications    sertraline (Zoloft) 50 MG tablet     Other Visit Diagnoses       Anxiety and depression    -  Primary    Relevant Medications    sertraline (Zoloft) 50 MG tablet    hydrOXYzine (ATARAX) 25 MG tablet    Other Relevant Orders    Ambulatory Referral to Behavioral Health    Suicidal ideation        Relevant Orders    Ambulatory Referral to Behavioral Health    Primary insomnia              Diagnoses         Codes Comments    Anxiety and depression    -  Primary ICD-10-CM: F41.9, F32.A  ICD-9-CM: 300.00, 311     Suicidal ideation     ICD-10-CM: R45.851  ICD-9-CM: V62.84     Primary insomnia     ICD-10-CM: F51.01  ICD-9-CM: 307.42     Chronic migraine without aura without status migrainosus, not intractable     ICD-10-CM: G43.709  ICD-9-CM: 346.70           Orders Placed This Encounter   Procedures    Ambulatory Referral to Behavioral Health     Referral Priority:   Urgent     Referral Type:   Behavorial Health/Psych     Referral Reason:   Specialty Services Required     Referred to Provider:   Lion Kahn APRN     Requested Specialty:   Behavioral Health     Number of Visits Requested:   1     Patient screened positive for depression based on a PHQ-9 score of 20 on 8/9/2023. Follow-up recommendations include: Elevated PHQ score reflective of acute illness, not depression, Prescribed antidepressant medication treatment, Referral to Mental Health specialist, and Suicide Risk Assessment performed.   Anxiety/depression/suicidal ideation- urgent referral psych APRN, start sertraline 50 mg x 1 week, check in in 1 week with PCP via mychart, follow up in 2 weeks in person, reach out to therapist if worsening sx, call [phone] or go to ER for worsening SI/HI. Verbal contract to not harm self   Reviewed med s.e profiles and risk of worsening SI.    Start hydroxyzine 25 mg tid for panic and sleep, may cause sedation, no alcohol  when using     Insomnia- Magnesium glycinate/ashwaganda 500 mg nightly  Melatonin up to 10 mg nightly  Sleep hygiene reviewed     Headache- tylenol , ibuprofen, excedrin migraine and magnesium. Hydrate w water        Education provided in AVS   Return in about 2 weeks (around 8/23/2023) for Recheck.   [facility] [place]  This Safety Plan Should be printed, and a copy should be placed on a refrigerator, mirror, etc. for easy visibility. A copy should be kept in your billfold or purse and a copy should be on your smart phone for easy retrieval.     8/9/2023   Patient Name: Linda iL  YOB: 1990    Linda participated filling out her safety plan in collaboration with FRANCISCO JAVIER Dhillon on 8/9/2023.    Suicide Prevention Hotline:   Call 138 or 1-960.699.2022 or text Talk at 464-695    1. List warning signs (thoughts, images, mood, thinking processes, behaviors, situations) that a crisis may be developing:  feeling overwhelmed, driving and drifting across center line             2. List internal coping strategies (what can you do on your own to help you not act on your thoughts/urges? i.e. relaxation techniques, physical activity):  deep breathing, phone a friend         3. List social contacts to provide support and distraction (who is supportive of you that you can talk to when you are stressed?):   Name:  polina campos                           Phone#:    4. What is one thing that is most important to you and is worth living for?  dogs    Making you environment safe (what means do you have access to and are likely to use to attempt suicide?  none     5. How can you limit access to these means?  N/a       6.Contacting Professional Agencies:     Therapist:                   Phone #     Psychiatrist:                  Phone #       Primary Care Provider: Yolanda Ramires APRN               Phone #  [phone]    What might be a barrier to using this safety plan?  fear

## 2023-08-11 ENCOUNTER — PATIENT ROUNDING (BHMG ONLY) (OUTPATIENT)
Dept: FAMILY MEDICINE CLINIC | Facility: CLINIC | Age: 33
End: 2023-08-11
Payer: COMMERCIAL

## 2023-08-11 NOTE — PROGRESS NOTES
A [employer] message has been sent to the patient for PATIENT ROUNDING with [facility].

## 2023-08-23 ENCOUNTER — OFFICE VISIT (OUTPATIENT)
Dept: FAMILY MEDICINE CLINIC | Facility: CLINIC | Age: 33
End: 2023-08-23
Payer: COMMERCIAL

## 2023-08-23 VITALS
SYSTOLIC BLOOD PRESSURE: 142 MMHG | HEIGHT: 72 IN | BODY MASS INDEX: 36.03 KG/M2 | DIASTOLIC BLOOD PRESSURE: 98 MMHG | RESPIRATION RATE: 18 BRPM | HEART RATE: 108 BPM | WEIGHT: 266 LBS | OXYGEN SATURATION: 98 %

## 2023-08-23 DIAGNOSIS — F32.A ANXIETY AND DEPRESSION: Primary | ICD-10-CM

## 2023-08-23 DIAGNOSIS — Z86.39 HISTORY OF VITAMIN D DEFICIENCY: ICD-10-CM

## 2023-08-23 DIAGNOSIS — Z11.59 ENCOUNTER FOR HEPATITIS C SCREENING TEST FOR LOW RISK PATIENT: ICD-10-CM

## 2023-08-23 DIAGNOSIS — F41.9 ANXIETY AND DEPRESSION: Primary | ICD-10-CM

## 2023-08-23 DIAGNOSIS — F51.01 PRIMARY INSOMNIA: ICD-10-CM

## 2023-08-23 DIAGNOSIS — R03.0 ELEVATED BP WITHOUT DIAGNOSIS OF HYPERTENSION: ICD-10-CM

## 2023-08-23 DIAGNOSIS — R53.83 OTHER FATIGUE: ICD-10-CM

## 2023-08-23 PROCEDURE — 99214 OFFICE O/P EST MOD 30 MIN: CPT | Performed by: NURSE PRACTITIONER

## 2023-08-23 RX ORDER — TRAZODONE HYDROCHLORIDE 50 MG/1
50 TABLET ORAL NIGHTLY
Qty: 30 TABLET | Refills: 0 | Status: SHIPPED | OUTPATIENT
Start: 2023-08-23 | End: 2023-09-22

## 2023-08-23 RX ORDER — SERTRALINE HYDROCHLORIDE 100 MG/1
100 TABLET, FILM COATED ORAL DAILY
Qty: 30 TABLET | Refills: 0
Start: 2023-08-23 | End: 2023-09-22

## 2023-08-23 NOTE — PROGRESS NOTES
Subjective   Linda Li is a 33 y.o. female.     Anxiety      Her past medical history is significant for depression.   Depression   Estab pt here for 2 week follow up on depression, anxiety and insomnia.     Pt going through divorce with ex partner and has had conflicts with parents. No contact now w parents. Sick dog w ACL injury. Not sleeping well, hydroxyzine does not help her sleep. Has had dry mouth and sleepy. Sleeping around 3 hrs at night. Has tried melatonin, magnesium, benadryl, nyquil.  Chronic anxiety and depression Zoloft 50 mg daily x 2 weeks, no worsening SI.She would like to increase this med . Prev on lexapro, buspirone in the past.   No hx of seizures.    AGUSTO 7 score : 14  No acute SI or HI.   No drug use, no drinking alcohol. She is going to work, no missed days. Has a close friend at work who is her main support. Seeing psych APRN next week    Elevated BP without htn. BP elevated, pt is not sleeping, drink 2 coffees/day, severe anxiety and depression, stress.    The following portions of the patient's history were reviewed and updated as appropriate: allergies, current medications, past family history, past medical history, past social history, past surgical history and problem list.    Review of Systems    Objective   Physical Exam  Vitals reviewed.   Constitutional:       Appearance: Normal appearance. She is obese.   Cardiovascular:      Rate and Rhythm: Normal rate and regular rhythm.      Pulses: Normal pulses.      Heart sounds: Normal heart sounds.   Skin:     General: Skin is warm.   Neurological:      General: No focal deficit present.      Mental Status: She is alert.   Psychiatric:         Mood and Affect: Mood is anxious and depressed. Affect is not tearful.       Vitals:    08/23/23 1638   BP: 142/98   Pulse:    Resp:    SpO2:      Body mass index is 36.08 kg/mý.    Procedures    Assessment & Plan   Problems Addressed this Visit    None  Visit Diagnoses       Anxiety and  depression    -  Primary    Relevant Medications    traZODone (DESYREL) 50 MG tablet    sertraline (Zoloft) 100 MG tablet    Other Relevant Orders    CBC & Differential    Comprehensive Metabolic Panel    Lipid Panel With / Chol / HDL Ratio    TSH    T4, Free    Primary insomnia        Relevant Orders    CBC & Differential    TSH    T4, Free    Vitamin D 25 hydroxy    Elevated BP without diagnosis of hypertension        Encounter for hepatitis C screening test for low risk patient        Relevant Orders    Hepatitis C Antibody    History of vitamin D deficiency        Relevant Orders    CBC & Differential    Vitamin D 25 hydroxy    Other fatigue        Relevant Orders    CBC & Differential    Vitamin D 25 hydroxy          Diagnoses         Codes Comments    Anxiety and depression    -  Primary ICD-10-CM: F41.9, F32.A  ICD-9-CM: 300.00, 311     Primary insomnia     ICD-10-CM: F51.01  ICD-9-CM: 307.42     Elevated BP without diagnosis of hypertension     ICD-10-CM: R03.0  ICD-9-CM: 796.2     Encounter for hepatitis C screening test for low risk patient     ICD-10-CM: Z11.59  ICD-9-CM: V73.89     History of vitamin D deficiency     ICD-10-CM: Z86.39  ICD-9-CM: V12.1     Other fatigue     ICD-10-CM: R53.83  ICD-9-CM: 780.79           Anxiety/depression/insomnia-  Patient will be seeing psychiatric nurse practitioner next week.  For this week increase sertraline to 100 mg every morning, add trazodone 50 mg nightly.  Patient has been on this medication in the past and did not find this terribly beneficial.  Reviewed medication side effect profile with patient.  May take half tab x3 days then increase to 50 mg.  Reviewed sleep hygiene, patient has had no luck with melatonin, can try magnesium glycinate 500 mg nightly.  Encourage daily exercise, watching [place], early sunlight exposure.  Try guided meditation, yoga.  ER for any suicidal ideation or worsening symptoms or call 988 suicide prevention line.  Patient is aware of  resources.    Elevated blood pressure-likely situational with poor sleep and anxiety/stress at home.  Encourage low-sodium diet, limit caffeine, limit stress, walk daily.  We will monitor    Patient has not had recent lab work.  She is here too late in the day to obtain this today.  She is aware we will need to know electrolytes, liver and renal function and lipid panel with medication management.  Future orders placed.     Education provided in AVS   Return in about 6 months (around 2/23/2024) for Recheck.

## 2023-08-29 RX ORDER — TRAZODONE HYDROCHLORIDE 50 MG/1
50 TABLET ORAL NIGHTLY
Qty: 30 TABLET | Refills: 0 | Status: SHIPPED | OUTPATIENT
Start: 2023-08-29 | End: 2023-10-01

## 2023-09-01 ENCOUNTER — TELEMEDICINE (OUTPATIENT)
Dept: BEHAVIORAL HEALTH | Facility: CLINIC | Age: 33
End: 2023-09-01
Payer: COMMERCIAL

## 2023-09-01 DIAGNOSIS — G47.00 INSOMNIA, UNSPECIFIED TYPE: ICD-10-CM

## 2023-09-01 DIAGNOSIS — F33.2 SEVERE EPISODE OF RECURRENT MAJOR DEPRESSIVE DISORDER, WITHOUT PSYCHOTIC FEATURES: Primary | ICD-10-CM

## 2023-09-01 DIAGNOSIS — Z63.8 FAMILY CONFLICT: ICD-10-CM

## 2023-09-01 DIAGNOSIS — F41.1 GAD (GENERALIZED ANXIETY DISORDER): ICD-10-CM

## 2023-09-01 RX ORDER — BUPROPION HYDROCHLORIDE 150 MG/1
150 TABLET ORAL EVERY MORNING
Qty: 30 TABLET | Refills: 0 | Status: SHIPPED | OUTPATIENT
Start: 2023-09-01

## 2023-09-01 RX ORDER — TRAZODONE HYDROCHLORIDE 50 MG/1
50 TABLET ORAL NIGHTLY
Qty: 30 TABLET | Refills: 0 | Status: CANCELLED | OUTPATIENT
Start: 2023-09-01 | End: 2023-10-01

## 2023-09-01 RX ORDER — SERTRALINE HYDROCHLORIDE 100 MG/1
100 TABLET, FILM COATED ORAL DAILY
Qty: 30 TABLET | Refills: 0 | Status: SHIPPED | OUTPATIENT
Start: 2023-09-01 | End: 2023-10-01

## 2023-09-01 RX ORDER — SERTRALINE HYDROCHLORIDE 100 MG/1
100 TABLET, FILM COATED ORAL DAILY
Qty: 30 TABLET | Refills: 0 | Status: CANCELLED | OUTPATIENT
Start: 2023-09-01 | End: 2023-10-01

## 2023-09-01 SDOH — SOCIAL STABILITY - SOCIAL INSECURITY: OTHER SPECIFIED PROBLEMS RELATED TO PRIMARY SUPPORT GROUP: Z63.8

## 2023-09-01 NOTE — TELEPHONE ENCOUNTER
Called [facility] to confirm that trazodone prescription was received. Pharmacy tech confirmed receipt of trazodone. Sertraline prescription was not sent electronically (set to print), pharmacy confirms they have not received this prescription. Pended sertraline for Yolanda's signature.     LAST VISIT - 08.23.23  NEXT VISIT - 10.02.23

## 2023-09-01 NOTE — PATIENT INSTRUCTIONS
Medication changes:   Okay to start Wellbutrin/bupropion  mg take first thing in the morning.  Okay to start trazodone 50 mg at bedtime as needed for sleep, can take 1 to 2 tablets, be in bed within 30 minutes, give yourself a 7 to 8-hour devoted to sleep, do not take then drive a car or do something where you can injure yourself, may make you groggy the next morning that should improve. (Pt has not started yet, ordered by PCP)  Continue Zoloft 100 mg as previously ordered, has been on higher dose X 1 week.    Therapy recommendation: Veena Brown, establish therapist, patient reports she has not seen her in 5 weeks due to financial reasons and being preoccupied with work/injured dog.  Support people identified include work friend Anel Quevedo, patient instructed to reach out if needed.  Patient educated that if she fails to improve with medications and individual therapy a higher level of care will be recommended, patient educated on intensive outpatient program at the [facility] and at [facility], these are normally 3 hours a day Monday through Friday they offer morning and evening programs.   I would like to see patient in 2 weeks but my schedule is completely full for a month, patient instructed to reach out to me in 2 weeks with an update.    GENERAL NEW PATIENT INSTRUCTIONS:  -The best way to get a hold of Saroj is to call the office at [phone] and ask to leave a message with his medical assistant.  Patient's are not able to message their mental health provider directly via [place], please give my office up to 48 hours to respond to a patient call/question/refill request.  -Please call 911 or go to the nearest ER if you begin to have thoughts of harming yourself or other people.  -Refill requests will be made during normal office hours, Monday-Friday 8:00-5:30.  Saroj is out of the office on Wednesdays and weekends.  Follow-up appointments must be maintained in order for prescribing to continue.    -Tuesdays  and Thursdays are Saroj's in-office days, Mondays and Fridays are his telehealth days.  The decision to be seen virtually or in person is up to the discretion of the provider, not all behavioral health problems are appropriate for telehealth.    NO SHOW POLICY:  We understand unexpected circumstances arise; however, anytime you miss your appointment we are unable to provide you appropriate care.  In addition, each appointment missed could have been used to provide care for others.  We ask that you call at least 24 hours in advance to cancel or reschedule an appointment. We would like to take this opportunity to remind you of our policy stating patients who miss THREE or more appointments without cancelling or rescheduling 24 hours in advance of the appointment may be subject to cancellation of any further visits with our clinic and recommendation to seek in-person services/visits. Please call [phone] to reschedule your appointment. If there are reasons that make it difficult for you to keep the appointments, please call and let us know how we can help. Please understand that medication prescribing will not continue without seeing your provider.       [facility]   [address] [place] [address]  P: [phone]  F: [phone]

## 2023-09-01 NOTE — PROGRESS NOTES
Patient assessed today via MyChart through [employer] pt is at work in a secure environment and verbalizes privacy during interview. NAKUL Kyle is at home in a secure environment using a secure laptop.The patient's condition being diagnosed/treated is appropriate for telemedicine.The provider identified himself as well as his credentials.The patient, and/or patient's guardian, consent to be seen remotely, and when consent is given they understand that the consent allows for patient identifiable information to be sent to a third party as needed. They may refuse to be seen remotely at any time. The electronic data is encrypted and password protected, and the patient and/or guardian has been advised of the potential risks to privacy not withstanding such measures.    [facility] PROGRESS NOTE  [facility]  [address] [place] [address]    NAME: Linda Li     : 1990   MRN: [id number]   PCP: Yolanda Ramires APRN     DATE: 2023    ALLERGY:Patient has no known allergies.     MEDICATIONS:  buPROPion XL  hydrOXYzine  sertraline  traZODone     VITALS: There were no vitals taken for this visit. No LMP recorded. Patient has had an injection.     Subjective     Chief Complaint   Patient presents with    Depression    Anxiety    Suicidal      HPI:  33 y.o. female patient seen for the first time today for initial evaluation at the request of Yolanda Ramires APRN.  Patient was seen by PCP for worsening depression/anxiety, has been on Zoloft for roughly 3 to 4 weeks, has been on 100 mg X1 week, minimal response reported, patient educated on slow onset/titration of meds, trazodone was ordered for sleep, patient is due to pick it up today due to prescription going to wrong pharmacy, patient uses as needed hydroxyzine nightly to help with sleep.  Patient reports frequent thoughts of death/dying/not being here anymore, denies specific plan or intent, denies access to firearms or lethal medication,  denies history of past attempts, denies history of psych hospitalization or self-harm.  Significant family conflict reported, patient has history of going no contact with her brother who was diagnosed with narcissism who lives out of town, conflict also reported with parents who she has little to no contact with currently stemming from divorce 3 years ago, at that time patient and  were living with parents, parents sided with the  and he continued to live there while patient had to move out and stay in hotel rooms.  Patient reports family is more upset about divorce than her coming out of the closet at that time, orignial plan was to do family therapy but it appears this has been put on pause for the moment  Patient also reports her injured dog has significantly impacted her mental health, has a torn ACL.  Patient has an established therapist who does not take insurance, has not seen X5 weeks, reports financial barriers and preoccupation with work/dog has kept her from making an appointment.  Significant sleep disturbance reported, patient gets on average around 3 hours at night, reports issues falling and staying asleep.  No S/S of catrachita/hypomania/psychosis reported or in evidence.    PSYCHIATRIC HISTORY     -Depression/Anxiety: Chronic, on/off exacerbated by divorce/family conflict/coming out 3 years ago. HX of lexapro & buspar, changed to zoloft  by PCP prior to initial eval with Saroj today. No SI attempts, no psych hospitalizations, no self harm. Current therapist Eder Brown.  Past treatments include psychotherapy and medications, patient has never done group therapy, or an intensive outpatient program, TMS, ECT, or Spravato.  Patient reports history of a handful of concussions, no apparent deficit, denies history of seizures.  No history of physical/sexual abuse reported.  Patient denies history of drug/alcohol abuse.    Social Status:  Ethnic Group: Not  Or   Race: White Or    Marital Status: Single   Employment status: Full Time [employer]       SUBSTANCE/SEXUAL HISTORY:   reports that she has never smoked. She has never used smokeless tobacco. She reports current alcohol use. She reports that she does not use drugs.   reports that she is not currently sexually active and has had partner(s) who are female. She reports using the following method of birth control/protection: Same-sex partner.      FAMILY HISTORY:  family history includes Alcohol abuse in her maternal grandfather and paternal grandfather; Breast cancer in her mother; Cancer in her father and mother; Colon cancer in her paternal grandfather; Diabetes in her maternal grandfather and maternal grandmother; Heart attack in her paternal grandmother; Heart disease in her maternal grandfather and maternal grandmother; Leukemia in her father; Stroke in her maternal grandfather.     PAST MEDICAL HISTORY   has a past medical history of Abnormal ECG (1/6/23), Anxiety, Depression, Dyspnea, Essential hypertension, Fissure, anal (Spring 2021), Head injury, Migraine, Migraines, Rectal bleeding, and Tachycardia.    She has no past medical history of Alcohol abuse, Bulimia nervosa, Disease of thyroid gland, Liver disease, Seizures, Self-injurious behavior, Substance abuse, or Suicide attempt.     Review of Systems   Constitutional:  Positive for fatigue. Negative for chills and fever.   Respiratory:  Negative for chest tightness and shortness of breath.    Cardiovascular:  Negative for chest pain.   Gastrointestinal:  Negative for nausea and vomiting.   Neurological:  Positive for headache. Negative for dizziness and light-headedness.   Psychiatric/Behavioral:  Positive for decreased concentration, sleep disturbance, suicidal ideas, depressed mood and stress. The patient is nervous/anxious.      Objective   Physical Exam  Constitutional:       Appearance: Normal appearance.   HENT:      Head: Normocephalic.    Pulmonary:      Effort: Pulmonary effort is normal.   Skin:     General: Skin is dry.   Neurological:      General: No focal deficit present.      Mental Status: She is alert and oriented to person, place, and time.   Psychiatric:         Mood and Affect: Mood normal.         Behavior: Behavior normal.         Thought Content: Thought content normal.     PHQ-9 Depression Screening  Little interest or pleasure in doing things? (P) 3-->nearly every day   Feeling down, depressed, or hopeless? (P) 3-->nearly every day   Trouble falling or staying asleep, or sleeping too much? (P) 3-->nearly every day   Feeling tired or having little energy?     Poor appetite or overeating? (P) 2-->more than half the days   Feeling bad about yourself/you are a failure/have let yourself or your family down? (P) 3-->nearly every day   Trouble concentrating on things? (P) 3-->nearly every day   Psychomotor agitation/retardation (P) 2-->more than half the days   Thoughts about death/dying/suicide (P) 2-->more than half the days   PHQ-9 Total Score (P) 24   How difficult have these problems for you? (P) extremely difficult     GAD7 Documentation:  Feeling nervous, anxious or on edge (P) 3   Not being able to stop or control worrying (P) 3   Worrying too much about different things (P) 3   Trouble relaxing (P) 3   Being so restless that it is hard to sit still (P) 1   Becoming easily annoyed or irritable (P) 2   Feeling Afraid as if something awful might happen (P) 0   AGUSTO Total Score (P) 15   How difficult have these problems made it for you? (P) Extremely difficult     MENTAL STATUS EXAM   General Appearance:  Cleanly groomed and dressed  Eye Contact:  Good eye contact  Attitude:  Cooperative  Motor Activity:  Normal gait, posture  Speech:  Soft spoken  Language:  Spontaneous  Mood and affect:  Depressed  Thought Process:  Goal-directed  Associations/ Thought Content:  No delusions  Hallucinations:  None  Suicidal Ideations:  Passive  ideation (no plan/intent)  Homicidal Ideation:  Not present  Sensorium:  Alert  Orientation:  Person, place, time and situation  Attention Span/ Concentration:  Good  Fund of Knowledge:  Appropriate for age and educational level  Intellectual Functioning:  Average range  Insight:  Good  Judgement:  Fair  Reliability:  Fair  Impulse Control:  Fair     LABS:  Lab Results   Component Value Date    GLUCOSE 83 01/06/2023    BUN 12 01/06/2023    CREATININE 0.88 01/06/2023    EGFR 90.8 01/06/2023    BCR 13.6 01/06/2023    K 4.3 01/06/2023    CO2 27.0 01/06/2023    CALCIUM 9.4 01/06/2023    ALBUMIN 4.8 01/06/2023    BILITOT 0.6 01/06/2023    AST 38 (H) 01/06/2023    ALT 28 01/06/2023         CBC          1/6/2023    12:30   CBC   WBC 6.09    RBC 6.07    Hemoglobin 15.4    Hematocrit 48.7    MCV 80.2    MCH 25.4    MCHC 31.6    RDW 13.2    Platelets 262      Last Urine Toxicity           No data to display               Assessment    ASSESSMENT/TREATMENT PLAN/INSTRUCTIONS/EDUCATION    (F33.2) Severe episode of recurrent major depressive disorder, without psychotic features - Plan: buPROPion XL (Wellbutrin XL) 150 MG 24 hr tablet    (F41.1) AGUSTO (generalized anxiety disorder) - Plan: buPROPion XL (Wellbutrin XL) 150 MG 24 hr tablet    (G47.00) Insomnia, unspecified type    (Z63.8) Family conflict     -The above listed condition/conditions are newly identified to this provider.    AFTER VISIT SUMMARY INSTRUCTIONS:    Medication changes:   Okay to start Wellbutrin/bupropion  mg take first thing in the morning.  Okay to start trazodone 50 mg at bedtime as needed for sleep, can take 1 to 2 tablets, be in bed within 30 minutes, give yourself a 7 to 8-hour devoted to sleep, do not take then drive a car or do something where you can injure yourself, may make you groggy the next morning that should improve. (Pt has not started yet, ordered by PCP)  Continue Zoloft 100 mg as previously ordered, has been on higher dose X 1  week.    Therapy recommendation: Veena Brown, establish therapist, patient reports she has not seen her in 5 weeks due to financial reasons and being preoccupied with work/injured dog.  Support people identified include work friend Anel Quevedo, patient instructed to reach out if needed.  Patient educated that if she fails to improve with medications and individual therapy a higher level of care will be recommended, patient educated on intensive outpatient program at the [facility] and at [facility], these are normally 3 hours a day Monday through Friday they offer morning and evening programs.   I would like to see patient in 2 weeks but my schedule is completely full for a month, patient instructed to reach out to me in 2 weeks with an update.    -Please call the office at [phone] with any worsening of symptoms or onset of intolerable side effects, please ask to leave a message with Saroj's medical assistant.  Please give my office up to 48 hours to respond to a patient call/question/refill request.  -Patient is agreeable to call 911 or go to the nearest ER should he/she/they have any thoughts of harm to self or others.  -Patient has been educated on providers schedule, contact information, and patient/provider expectations.   -Patient has been educated regarding multimodal approach with healthy nutrition, healthy sleep, regular physical activity, social activities, counseling, and medications.     Return in 1 month (on 10/1/2023) for Medication Check.    MEDICATION ISSUES:  [place]: Reviewed 09/01/2023 via EMR interface    There are no discontinued medications.  New Medications Ordered This Visit   Medications    buPROPion XL (Wellbutrin XL) 150 MG 24 hr tablet     Sig: Take 1 tablet by mouth Every Morning.     Dispense:  30 tablet     Refill:  0      No orders of the defined types were placed in this encounter.    -Barriers: multiple psychiatric comorbidities , stress, and low support  -Strengths:  motivated ,  resourceful, and pets    -Short-Term Goals: Pt will be compliant with medication management and note improvement in S/S over the next 4 to 6 weeks or at next scheduled visit.  -Long-Term Goals: Pt will be compliant with the agreed treatment plan including medication regimen & F/U appt's and deny impairment in daily functioning over the next 6 months.      -Progress toward goal: Not at goal  -Functional Status: Moderate impairment   -Prognosis: Fair with Ongoing Treatment        SUMMARY/DISCUSSION:  Pt past social/medical/family history reviewed/updated. ROS conducted, medications/allergies, reviewed, patient was screened today for depression/anxiety, PHQ/AGUSTO scores reviewed.  Most recent vitals/labs reviewed. Pt was given appropriate time to ask questions and concerns were addressed. A thorough discussion was had that included review of disease process, need for continued monitoring and additional treatment options including use of pharmacological and non-pharmacological approaches to care, decisions were made and agreed upon by patient and provider. Discussed the risks, benefits, and potential side effects of the medications; patient ackowledged and verbally consented.     NO SHOW POLICY:  We understand unexpected circumstances arise; however, anytime you miss your appointment we are unable to provide you appropriate care.  In addition, each appointment missed could have been used to provide care for others.  We ask that you call at least 24 hours in advance to cancel or reschedule an appointment. We would like to take this opportunity to remind you of our policy stating patients who miss THREE or more appointments without cancelling or rescheduling 24 hours in advance of the appointment may be subject to cancellation of any further visits with our clinic and recommendation to seek in-person services/visits. Please call [phone] to reschedule your appointment. If there are reasons that make it difficult for you  to keep the appointments, please call and let us know how we can help. Please understand that medication prescribing will not continue without seeing your provider.       Part of this note may be an electronic transcription/translation of spoken language to printed text using the Dragon Dictation System. Some of the data in this electronic note has been brought forward from a previous encounter, any necessary changes have been made, it has been reviewed by this APRN, and it is accurate.    This document has been electronically signed by FRANCISCO JAVIER Leon September 1, 2023 09:30 EDT

## 2023-09-01 NOTE — TELEPHONE ENCOUNTER
Caller: Linda Li    Relationship: Self    Best call back number: [phone]     Requested Prescriptions:   Requested Prescriptions     Pending Prescriptions Disp Refills    sertraline (Zoloft) 100 MG tablet 30 tablet 0     Sig: Take 1 tablet by mouth Daily for 30 days.    traZODone (DESYREL) 50 MG tablet 30 tablet 0     Sig: Take 1 tablet by mouth Every Night for 30 days.        Pharmacy where request should be sent: [facility]     Last office visit with prescribing clinician: 8/23/2023   Last telemedicine visit with prescribing clinician: Visit date not found   Next office visit with prescribing clinician: Visit date not found     Additional details provided by patient: PATIENT STATES THE ZOLOFT WAS UNSIGNED AND THE TRAZODONE WAS SENT TO THE WRONG PHARMACY     Does the patient have less than a 3 day supply:  [x] Yes  [] No    Would you like a call back once the refill request has been completed: [] Yes [x] No    If the office needs to give you a call back, can they leave a voicemail: [] Yes [x] No    Ej Espinal Rep   09/01/23 13:11 EDT

## 2023-09-26 DIAGNOSIS — F41.1 GAD (GENERALIZED ANXIETY DISORDER): ICD-10-CM

## 2023-09-26 DIAGNOSIS — F33.2 SEVERE EPISODE OF RECURRENT MAJOR DEPRESSIVE DISORDER, WITHOUT PSYCHOTIC FEATURES: ICD-10-CM

## 2023-09-26 RX ORDER — TRAZODONE HYDROCHLORIDE 50 MG/1
50 TABLET ORAL NIGHTLY
Qty: 30 TABLET | Refills: 0 | Status: SHIPPED | OUTPATIENT
Start: 2023-09-26 | End: 2023-10-29

## 2023-09-26 RX ORDER — BUPROPION HYDROCHLORIDE 150 MG/1
150 TABLET ORAL EVERY MORNING
Qty: 30 TABLET | Refills: 0 | Status: SHIPPED | OUTPATIENT
Start: 2023-09-26 | End: 2023-10-02 | Stop reason: SDUPTHER

## 2023-09-26 RX ORDER — SERTRALINE HYDROCHLORIDE 100 MG/1
100 TABLET, FILM COATED ORAL DAILY
Qty: 30 TABLET | Refills: 0 | Status: SHIPPED | OUTPATIENT
Start: 2023-09-26 | End: 2023-10-29

## 2023-10-02 ENCOUNTER — TELEMEDICINE (OUTPATIENT)
Dept: BEHAVIORAL HEALTH | Facility: CLINIC | Age: 33
End: 2023-10-02
Payer: COMMERCIAL

## 2023-10-02 DIAGNOSIS — F33.2 SEVERE EPISODE OF RECURRENT MAJOR DEPRESSIVE DISORDER, WITHOUT PSYCHOTIC FEATURES: Primary | ICD-10-CM

## 2023-10-02 DIAGNOSIS — G47.00 INSOMNIA, UNSPECIFIED TYPE: ICD-10-CM

## 2023-10-02 DIAGNOSIS — Z63.8 FAMILY CONFLICT: ICD-10-CM

## 2023-10-02 PROCEDURE — 99214 OFFICE O/P EST MOD 30 MIN: CPT

## 2023-10-02 RX ORDER — BUPROPION HYDROCHLORIDE 300 MG/1
300 TABLET ORAL EVERY MORNING
Qty: 30 TABLET | Refills: 0 | Status: SHIPPED | OUTPATIENT
Start: 2023-10-02

## 2023-10-02 SDOH — SOCIAL STABILITY - SOCIAL INSECURITY: OTHER SPECIFIED PROBLEMS RELATED TO PRIMARY SUPPORT GROUP: Z63.8

## 2023-10-02 NOTE — PATIENT INSTRUCTIONS
Medication changes:    Double dose of Wellbutrin to 300 mg in morning  Zoloft & Trazodone continue as previously ordered    Therapy recommendation: Call Ceci and make appt, has not seen in 1.5-2 months  Urgent Referral: [facility] for Spravato/esketamine nasal spray treatment, give my office 7 days to reach out to you about appt.    GENERAL NEW PATIENT INSTRUCTIONS:  -The best way to get a hold of Saroj is to call the office at [phone] and ask to leave a message with his medical assistant.  Patient's are not able to message their mental health provider directly via [facility], please give my office up to 48 hours to respond to a patient call/question/refill request.  -Please call 911 or go to the nearest ER if you begin to have thoughts of harming yourself or other people.  -Refill requests will be made during normal office hours, Monday-Friday 8:00-5:30.  Saroj is out of the office on Wednesdays and weekends.  Follow-up appointments must be maintained in order for prescribing to continue.    -Tuesdays and Thursdays are Saroj's in-office days, Mondays and Fridays are his telehealth days.  The decision to be seen virtually or in person is up to the discretion of the provider, not all behavioral health problems are appropriate for telehealth.    NO SHOW POLICY:  We understand unexpected circumstances arise; however, anytime you miss your appointment we are unable to provide you appropriate care.  In addition, each appointment missed could have been used to provide care for others.  We ask that you call at least 24 hours in advance to cancel or reschedule an appointment. We would like to take this opportunity to remind you of our policy stating patients who miss THREE or more appointments without cancelling or rescheduling 24 hours in advance of the appointment may be subject to cancellation of any further visits with our clinic and recommendation to seek in-person services/visits. Please call [phone] to  reschedule your appointment. If there are reasons that make it difficult for you to keep the appointments, please call and let us know how we can help. Please understand that medication prescribing will not continue without seeing your provider.       [facility]   [address] [place] [id number]  P: [phone]  F: [phone]

## 2023-10-02 NOTE — PROGRESS NOTES
Patient assessed today via MyChart through EPIC pt is at home in a secure environment and verbalizes privacy during interview. NAKUL Kyle is at home in a secure environment using a secure laptop.The patient's condition being diagnosed/treated is appropriate for telemedicine.The provider identified himself as well as his credentials.The patient, and/or patient's guardian, consent to be seen remotely, and when consent is given they understand that the consent allows for patient identifiable information to be sent to a third party as needed. They may refuse to be seen remotely at any time. The electronic data is encrypted and password protected, and the patient and/or guardian has been advised of the potential risks to privacy not withstanding such measures.    Subjective    PATIENT ID: Linda Li, :1990, MRN: [id number]    Chief Complaint   Patient presents with    Depression    Follow-up      HPI:   33 y.o. female pt presents to [facility] 10/02/2023.  Last seen 1 month ago, low-dose Wellbutrin was started for depression, Zoloft was continued, trazodone was ordered for sleep, since then patient reports things are about the same, cannot really tell if medication is helping or not, uses trazodone most nights is somewhat helpful with sleep gets around 4 to 5 hours, patient has not seen her therapist in 1.5 to 2 months, reports issues with scheduling with family/family therapist.    SUBSTANCE/SEXUAL HISTORY:   reports that she has never smoked. She has never used smokeless tobacco. She reports current alcohol use. She reports that she does not use drugs.   reports that she is not currently sexually active and has had partner(s) who are female. She reports using the following method of birth control/protection: Same-sex partner.      FAMILY HISTORY:  family history includes Alcohol abuse in her maternal grandfather and paternal grandfather; Breast cancer in her mother; Cancer  in her father and mother; Colon cancer in her paternal grandfather; Diabetes in her maternal grandfather and maternal grandmother; Heart attack in her paternal grandmother; Heart disease in her maternal grandfather and maternal grandmother; Leukemia in her father; Stroke in her maternal grandfather.     PAST MEDICAL HISTORY   has a past medical history of Abnormal ECG (1/6/23), Anxiety, Depression, Dyspnea, Essential hypertension, Fissure, anal (Spring 2021), Head injury, Migraine, Migraines, Rectal bleeding, and Tachycardia.    She has no past medical history of Alcohol abuse, Bulimia nervosa, Disease of thyroid gland, Liver disease, Seizures, Self-injurious behavior, Substance abuse, or Suicide attempt.     Review of Systems   Constitutional: Negative.  Negative for chills and fever.   Respiratory:  Negative for chest tightness and shortness of breath.    Cardiovascular:  Negative for chest pain.   Gastrointestinal:  Negative for nausea and vomiting.   Neurological:  Negative for dizziness and light-headedness.   Psychiatric/Behavioral:  Positive for sleep disturbance and depressed mood.      Objective   There were no vitals taken for this visit.     Physical Exam  Constitutional:       Appearance: Normal appearance.   HENT:      Head: Normocephalic.   Pulmonary:      Effort: Pulmonary effort is normal.   Skin:     General: Skin is dry.   Neurological:      General: No focal deficit present.      Mental Status: She is alert and oriented to person, place, and time.   Psychiatric:         Mood and Affect: Mood normal.         Behavior: Behavior normal.         Thought Content: Thought content normal.     MENTAL STATUS EXAM   General Appearance:  Cleanly groomed and dressed  Eye Contact:  Good eye contact  Attitude:  Cooperative  Speech:  Monotone and soft spoken  Mood and affect:  Depressed and flat  Thought Process:  Goal-directed  Associations/ Thought Content:  No delusions  Hallucinations:  None  Suicidal  Ideations:  Passive ideation (Denies specific plan/intent)  Homicidal Ideation:  Not present  Sensorium:  Alert  Orientation:  Person, place, time and situation  Fund of Knowledge:  Appropriate for age and educational level  Intellectual Functioning:  Average range  Insight:  Fair  Judgement:  Fair  Reliability:  Fair  Impulse Control:  Fair    PHQ-9 Depression Screening  Little interest or pleasure in doing things? (P) 3-->nearly every day   Feeling down, depressed, or hopeless? (P) 2-->more than half the days   Trouble falling or staying asleep, or sleeping too much? (P) 3-->nearly every day   Feeling tired or having little energy?     Poor appetite or overeating? (P) 1-->several days   Feeling bad about yourself/you are a failure/have let yourself or your family down? (P) 3-->nearly every day   Trouble concentrating on things? (P) 3-->nearly every day   Psychomotor agitation/retardation (P) 0-->not at all   Thoughts about death/dying/suicide (P) 2-->more than half the days   PHQ-9 Total Score (P) 20   How difficult have these problems for you? (P) extremely difficult     GAD7 Documentation:  Feeling nervous, anxious or on edge (P) 3   Not being able to stop or control worrying (P) 3   Worrying too much about different things (P) 2   Trouble relaxing (P) 1   Being so restless that it is hard to sit still (P) 0   Becoming easily annoyed or irritable (P) 3   Feeling Afraid as if something awful might happen (P) 0   AGUSTO Total Score (P) 12   How difficult have these problems made it for you? (P) Extremely difficult     LABS:  No visits with results within 1 Month(s) from this visit.   Latest known visit with results is:   Admission on 01/06/2023, Discharged on 01/06/2023   Component Date Value Ref Range Status    QT Interval 01/06/2023 348  ms Final    Glucose 01/06/2023 83  65 - 99 mg/dL Final    BUN 01/06/2023 12  6 - 20 mg/dL Final    Creatinine 01/06/2023 0.88  0.57 - 1.00 mg/dL Final    Sodium 01/06/2023 139  136  - 145 mmol/L Final    Potassium 01/06/2023 4.3  3.5 - 5.2 mmol/L Final    Slight hemolysis detected by analyzer. Results may be affected.    Chloride 01/06/2023 102  98 - 107 mmol/L Final    CO2 01/06/2023 27.0  22.0 - 29.0 mmol/L Final    Calcium 01/06/2023 9.4  8.6 - 10.5 mg/dL Final    Total Protein 01/06/2023 7.5  6.0 - 8.5 g/dL Final    Albumin 01/06/2023 4.8  3.5 - 5.2 g/dL Final    ALT (SGPT) 01/06/2023 28  1 - 33 U/L Final    AST (SGOT) 01/06/2023 38 (H)  1 - 32 U/L Final    Alkaline Phosphatase 01/06/2023 69  39 - 117 U/L Final    Total Bilirubin 01/06/2023 0.6  0.0 - 1.2 mg/dL Final    Globulin 01/06/2023 2.7  gm/dL Final    A/G Ratio 01/06/2023 1.8  g/dL Final    BUN/Creatinine Ratio 01/06/2023 13.6  7.0 - 25.0 Final    Anion Gap 01/06/2023 10.0  5.0 - 15.0 mmol/L Final    eGFR 01/06/2023 90.8  >60.0 mL/min/1.73 Final    National Kidney Foundation and American Society of Nephrology (ASN) Task Force recommended calculation based on the Chronic Kidney Disease Epidemiology Collaboration (CKD-EPI) equation refit without adjustment for race.    HCG Qualitative 01/06/2023 Negative  Negative Final    D-Dimer, Quantitative 01/06/2023 0.68 (H)  0.00 - 0.50 MCGFEU/mL Final    WBC 01/06/2023 6.09  3.40 - 10.80 10*3/mm3 Final    RBC 01/06/2023 6.07 (H)  3.77 - 5.28 10*6/mm3 Final    Hemoglobin 01/06/2023 15.4  12.0 - 15.9 g/dL Final    Hematocrit 01/06/2023 48.7 (H)  34.0 - 46.6 % Final    MCV 01/06/2023 80.2  79.0 - 97.0 fL Final    MCH 01/06/2023 25.4 (L)  26.6 - 33.0 pg Final    MCHC 01/06/2023 31.6  31.5 - 35.7 g/dL Final    RDW 01/06/2023 13.2  12.3 - 15.4 % Final    RDW-SD 01/06/2023 38.0  37.0 - 54.0 fl Final    MPV 01/06/2023 10.2  6.0 - 12.0 fL Final    Platelets 01/06/2023 262  140 - 450 10*3/mm3 Final    Neutrophil % 01/06/2023 59.1  42.7 - 76.0 % Final    Lymphocyte % 01/06/2023 33.8  19.6 - 45.3 % Final    Monocyte % 01/06/2023 6.1  5.0 - 12.0 % Final    Eosinophil % 01/06/2023 0.5  0.3 - 6.2 % Final     Basophil % 01/06/2023 0.3  0.0 - 1.5 % Final    Immature Grans % 01/06/2023 0.2  0.0 - 0.5 % Final    Neutrophils, Absolute 01/06/2023 3.60  1.70 - 7.00 10*3/mm3 Final    Lymphocytes, Absolute 01/06/2023 2.06  0.70 - 3.10 10*3/mm3 Final    Monocytes, Absolute 01/06/2023 0.37  0.10 - 0.90 10*3/mm3 Final    Eosinophils, Absolute 01/06/2023 0.03  0.00 - 0.40 10*3/mm3 Final    Basophils, Absolute 01/06/2023 0.02  0.00 - 0.20 10*3/mm3 Final    Immature Grans, Absolute 01/06/2023 0.01  0.00 - 0.05 10*3/mm3 Final    nRBC 01/06/2023 0.0  0.0 - 0.2 /100 WBC Final        No Known Allergies     Current Outpatient Medications   Medication Instructions    buPROPion XL (WELLBUTRIN XL) 300 mg, Oral, Every Morning    sertraline (ZOLOFT) 100 mg, Oral, Daily    traZODone (DESYREL) 50 mg, Oral, Nightly      Assessment    ASSESSMENT/TREATMENT PLAN/INSTRUCTIONS/EDUCATION     (F33.2) Treatment resistant depression - Plan: buPROPion XL (Wellbutrin XL) 300 MG 24 hr tablet, Ambulatory Referral to Behavioral Health    (G47.00) Insomnia, unspecified type    (Z63.8) Family conflict     -Depression: Change of diagnosis to treatment resistant depression due to patient trialing Lexapro, BuSpar, Zoloft, and Wellbutrin with only little additional improvement reported in Wellbutrin/Zoloft/Trazodone combination, agreed to referral for Spravato/esketamine nasal spray treatment.  Agreed to call therapist and schedule appointment, agreed to increase dose of Wellbutrin today.  Passive SI reported, denies specific plan/intent. Support people discussed today, positive coping skills discussed today,.    AFTER VISIT SUMMARY INSTRUCTIONS:    Medication changes:    Double dose of Wellbutrin to 300 mg in morning  Zoloft & Trazodone continue as previously ordered    Therapy recommendation: Call Ceci and make appt, has not seen in 1.5-2 months  Urgent Referral: [facility] for Spravato/esketamine nasal spray treatment, give my office 7 days to reach out to  you about appt.    -Please call the office at [phone] with any worsening of symptoms or onset of intolerable side effects, please ask to leave a message with Saroj's medical assistant.  Please give my office up to 48 hours to respond to a patient call/question/refill request.  -Patient is agreeable to call 911 or go to the nearest ER should he/she/they have any thoughts of harm to self or others.  -Patient has been educated on providers schedule, contact information, and patient/provider expectations.     FOLLOW UP: Return in 2 weeks (on 10/16/2023) for MDD, Med Changes, Video visit.    MEDICATION ISSUES:  [place]: Reviewed during F/U appt's via interface.    Medications Discontinued During This Encounter   Medication Reason    buPROPion XL (Wellbutrin XL) 150 MG 24 hr tablet Reorder     New Medications Ordered This Visit   Medications    buPROPion XL (Wellbutrin XL) 300 MG 24 hr tablet     Sig: Take 1 tablet by mouth Every Morning.     Dispense:  30 tablet     Refill:  0      Orders Placed This Encounter   Procedures    Ambulatory Referral to Behavioral Health     Referral Priority:   Urgent     Referral Type:   Behavorial Health/Psych     Referral Reason:   Specialty Services Required     Requested Specialty:   Behavioral Health     Number of Visits Requested:   1     -Barriers: history of multiple failed medications due to lack of efficacy and/or side effects   -Strengths:  motivated     -Progress toward goal: Not at goal  -Functional Status: Severe impairment  -Prognosis: Guarded with Ongoing Treatment     SUMMARY/DISCUSSION:  Pt past social/medical/family history reviewed/updated. ROS conducted, medications/allergies, reviewed.  Most recent vitals/labs reviewed. Pt was given appropriate time to ask questions and concerns were addressed. A thorough discussion was had that included review of disease process, need for continued monitoring and additional treatment options including use of pharmacological and  non-pharmacological approaches to care, decisions were made and agreed upon by patient and provider. Discussed the risks, benefits, and potential side effects of the medications; patient ackowledged and verbally consented.     Part of this note may be an electronic transcription/translation of spoken language to printed text using the Dragon Dictation System. Some of the data in this electronic note has been brought forward from a previous encounter, any necessary changes have been made, it has been reviewed by this APRN, and it is accurate.    This document has been electronically signed by FRANCISCO JAVIER Leon October 2, 2023 12:39 EDT

## 2023-10-20 ENCOUNTER — TELEMEDICINE (OUTPATIENT)
Dept: BEHAVIORAL HEALTH | Facility: CLINIC | Age: 33
End: 2023-10-20
Payer: COMMERCIAL

## 2023-10-20 DIAGNOSIS — Z63.8 FAMILY CONFLICT: ICD-10-CM

## 2023-10-20 DIAGNOSIS — G47.00 INSOMNIA, UNSPECIFIED TYPE: ICD-10-CM

## 2023-10-20 DIAGNOSIS — F33.2 SEVERE EPISODE OF RECURRENT MAJOR DEPRESSIVE DISORDER, WITHOUT PSYCHOTIC FEATURES: Primary | ICD-10-CM

## 2023-10-20 DIAGNOSIS — F41.1 GAD (GENERALIZED ANXIETY DISORDER): ICD-10-CM

## 2023-10-20 RX ORDER — SERTRALINE HYDROCHLORIDE 100 MG/1
200 TABLET, FILM COATED ORAL DAILY
Qty: 60 TABLET | Refills: 0 | Status: SHIPPED | OUTPATIENT
Start: 2023-10-20 | End: 2023-11-19

## 2023-10-20 SDOH — SOCIAL STABILITY - SOCIAL INSECURITY: OTHER SPECIFIED PROBLEMS RELATED TO PRIMARY SUPPORT GROUP: Z63.8

## 2023-10-20 NOTE — PROGRESS NOTES
Patient assessed today via MyChart through EPIC pt is at home in a secure environment and verbalizes privacy during interview. NAKUL Kyle is at home in a secure environment using a secure laptop.The patient's condition being diagnosed/treated is appropriate for telemedicine.The provider identified himself as well as his credentials.The patient, and/or patient's guardian, consent to be seen remotely, and when consent is given they understand that the consent allows for patient identifiable information to be sent to a third party as needed. They may refuse to be seen remotely at any time. The electronic data is encrypted and password protected, and the patient and/or guardian has been advised of the potential risks to privacy not withstanding such measures.    Subjective    PATIENT ID: Linda Li, :1990, MRN: [id number]    Chief Complaint   Patient presents with    Depression    Anxiety      HPI:   33 y.o. female pt presents to [facility] 10/20/2023.  Last seen 2 weeks ago at that time Wellbutrin was ordered for worsening depression, since then patient reports she cannot tell any difference, denies specific plan or intent with SI, denies new medications or new medical problems, does have an appointment [facility] for Spravato nasal spray treatment, did reach out to therapist to schedule appointment.    SUBSTANCE/SEXUAL HISTORY:   reports that she has never smoked. She has never used smokeless tobacco. She reports current alcohol use. She reports that she does not use drugs.   reports that she is not currently sexually active and has had partner(s) who are female. She reports using the following method of birth control/protection: Same-sex partner.      FAMILY HISTORY:  family history includes Alcohol abuse in her maternal grandfather and paternal grandfather; Breast cancer in her mother; Cancer in her father and mother; Colon cancer in her paternal grandfather; Diabetes  in her maternal grandfather and maternal grandmother; Heart attack in her paternal grandmother; Heart disease in her maternal grandfather and maternal grandmother; Leukemia in her father; Stroke in her maternal grandfather.     PAST MEDICAL HISTORY   has a past medical history of Abnormal ECG (1/6/23), Anxiety, Depression, Dyspnea, Essential hypertension, Fissure, anal (Spring 2021), Head injury, Migraine, Migraines, Rectal bleeding, and Tachycardia.    She has no past medical history of Alcohol abuse, Bulimia nervosa, Disease of thyroid gland, Liver disease, Seizures, Self-injurious behavior, Substance abuse, or Suicide attempt.     Review of Systems   Constitutional: Negative.  Negative for chills and fever.   Respiratory: Negative.     Cardiovascular: Negative.    Gastrointestinal:  Negative for nausea and vomiting.   Neurological: Negative.    Psychiatric/Behavioral:  Positive for depressed mood. The patient is nervous/anxious.        Objective   There were no vitals taken for this visit.     Physical Exam  Constitutional:       Appearance: Normal appearance.   HENT:      Head: Normocephalic.   Pulmonary:      Effort: Pulmonary effort is normal.   Skin:     General: Skin is dry.   Neurological:      General: No focal deficit present.      Mental Status: She is alert and oriented to person, place, and time.   Psychiatric:         Mood and Affect: Mood normal.         Behavior: Behavior normal.         Thought Content: Thought content normal.     MENTAL STATUS EXAM   General Appearance:  Cleanly groomed and dressed  Eye Contact:  Good eye contact  Attitude:  Cooperative  Speech:  Monotone and soft spoken  Mood and affect:  Depressed and flat  Thought Process:  Goal-directed  Associations/ Thought Content:  No delusions  Hallucinations:  None  Suicidal Ideations:  Passive ideation (Denies specific plan/intent)  Homicidal Ideation:  Not present  Sensorium:  Alert  Orientation:  Person, place, time and  situation  Fund of Knowledge:  Appropriate for age and educational level  Intellectual Functioning:  Average range  Insight:  Fair  Judgement:  Fair  Reliability:  Fair  Impulse Control:  Fair    PHQ-9 Depression Screening  Little interest or pleasure in doing things? (P) 3-->nearly every day   Feeling down, depressed, or hopeless? (P) 2-->more than half the days   Trouble falling or staying asleep, or sleeping too much? (P) 2-->more than half the days   Feeling tired or having little energy?     Poor appetite or overeating? (P) 1-->several days   Feeling bad about yourself/you are a failure/have let yourself or your family down? (P) 3-->nearly every day   Trouble concentrating on things? (P) 3-->nearly every day   Psychomotor agitation/retardation (P) 1-->several days   Thoughts about death/dying/suicide (P) 2-->more than half the days   PHQ-9 Total Score (P) 20   How difficult have these problems for you? (P) extremely difficult     GAD7 Documentation:  Feeling nervous, anxious or on edge (P) 3   Not being able to stop or control worrying (P) 3   Worrying too much about different things (P) 3   Trouble relaxing (P) 2   Being so restless that it is hard to sit still (P) 0   Becoming easily annoyed or irritable (P) 3   Feeling Afraid as if something awful might happen (P) 0   AGUSTO Total Score (P) 14   How difficult have these problems made it for you? (P) Extremely difficult     LABS:  No visits with results within 1 Month(s) from this visit.   Latest known visit with results is:   Admission on 01/06/2023, Discharged on 01/06/2023   Component Date Value Ref Range Status    QT Interval 01/06/2023 348  ms Final    Glucose 01/06/2023 83  65 - 99 mg/dL Final    BUN 01/06/2023 12  6 - 20 mg/dL Final    Creatinine 01/06/2023 0.88  0.57 - 1.00 mg/dL Final    Sodium 01/06/2023 139  136 - 145 mmol/L Final    Potassium 01/06/2023 4.3  3.5 - 5.2 mmol/L Final    Slight hemolysis detected by analyzer. Results may be affected.     Chloride 01/06/2023 102  98 - 107 mmol/L Final    CO2 01/06/2023 27.0  22.0 - 29.0 mmol/L Final    Calcium 01/06/2023 9.4  8.6 - 10.5 mg/dL Final    Total Protein 01/06/2023 7.5  6.0 - 8.5 g/dL Final    Albumin 01/06/2023 4.8  3.5 - 5.2 g/dL Final    ALT (SGPT) 01/06/2023 28  1 - 33 U/L Final    AST (SGOT) 01/06/2023 38 (H)  1 - 32 U/L Final    Alkaline Phosphatase 01/06/2023 69  39 - 117 U/L Final    Total Bilirubin 01/06/2023 0.6  0.0 - 1.2 mg/dL Final    Globulin 01/06/2023 2.7  gm/dL Final    A/G Ratio 01/06/2023 1.8  g/dL Final    BUN/Creatinine Ratio 01/06/2023 13.6  7.0 - 25.0 Final    Anion Gap 01/06/2023 10.0  5.0 - 15.0 mmol/L Final    eGFR 01/06/2023 90.8  >60.0 mL/min/1.73 Final    National Kidney Foundation and American Society of Nephrology (ASN) Task Force recommended calculation based on the Chronic Kidney Disease Epidemiology Collaboration (CKD-EPI) equation refit without adjustment for race.    HCG Qualitative 01/06/2023 Negative  Negative Final    D-Dimer, Quantitative 01/06/2023 0.68 (H)  0.00 - 0.50 MCGFEU/mL Final    WBC 01/06/2023 6.09  3.40 - 10.80 10*3/mm3 Final    RBC 01/06/2023 6.07 (H)  3.77 - 5.28 10*6/mm3 Final    Hemoglobin 01/06/2023 15.4  12.0 - 15.9 g/dL Final    Hematocrit 01/06/2023 48.7 (H)  34.0 - 46.6 % Final    MCV 01/06/2023 80.2  79.0 - 97.0 fL Final    MCH 01/06/2023 25.4 (L)  26.6 - 33.0 pg Final    MCHC 01/06/2023 31.6  31.5 - 35.7 g/dL Final    RDW 01/06/2023 13.2  12.3 - 15.4 % Final    RDW-SD 01/06/2023 38.0  37.0 - 54.0 fl Final    MPV 01/06/2023 10.2  6.0 - 12.0 fL Final    Platelets 01/06/2023 262  140 - 450 10*3/mm3 Final    Neutrophil % 01/06/2023 59.1  42.7 - 76.0 % Final    Lymphocyte % 01/06/2023 33.8  19.6 - 45.3 % Final    Monocyte % 01/06/2023 6.1  5.0 - 12.0 % Final    Eosinophil % 01/06/2023 0.5  0.3 - 6.2 % Final    Basophil % 01/06/2023 0.3  0.0 - 1.5 % Final    Immature Grans % 01/06/2023 0.2  0.0 - 0.5 % Final    Neutrophils, Absolute 01/06/2023 3.60   1.70 - 7.00 10*3/mm3 Final    Lymphocytes, Absolute 01/06/2023 2.06  0.70 - 3.10 10*3/mm3 Final    Monocytes, Absolute 01/06/2023 0.37  0.10 - 0.90 10*3/mm3 Final    Eosinophils, Absolute 01/06/2023 0.03  0.00 - 0.40 10*3/mm3 Final    Basophils, Absolute 01/06/2023 0.02  0.00 - 0.20 10*3/mm3 Final    Immature Grans, Absolute 01/06/2023 0.01  0.00 - 0.05 10*3/mm3 Final    nRBC 01/06/2023 0.0  0.0 - 0.2 /100 WBC Final      No Known Allergies     Current Outpatient Medications   Medication Instructions    buPROPion XL (WELLBUTRIN XL) 300 mg, Oral, Every Morning    sertraline (ZOLOFT) 200 mg, Oral, Daily    traZODone (DESYREL) 50 mg, Oral, Nightly      Assessment    ASSESSMENT/TREATMENT PLAN/INSTRUCTIONS/EDUCATION     (F33.2) Treatment resistant depression - Plan: sertraline (Zoloft) 100 MG tablet    (G47.00) Insomnia, unspecified type - Plan: sertraline (Zoloft) 100 MG tablet    (F41.1) AGUSTO (generalized anxiety disorder) - Plan: sertraline (Zoloft) 100 MG tablet    (Z63.8) Family conflict     -Depression: Unchanged, patient educated on slow onset/duration of meds, agreed to increase Zoloft to 150 mg X7 days then to 200 mg and continue, continue Wellbutrin and trazodone, continue counseling with established therapist, encouraged initial eval with [facility] for Spravato nasal spray treatment.  Consider mood stabilizer Lamictal or lithium in the future if needed, positive coping skills discussed today, regular exercise discussed today, agreed to follow-up in 1 month.    -Please call the office at [phone] with any worsening of symptoms or onset of intolerable side effects, please ask to leave a message with Saroj's medical assistant.  Please give my office up to 48 hours to respond to a patient call/question/refill request.  -Patient is agreeable to call 911 or go to the nearest ER should he/she/they have any thoughts of harm to self or others.  -Patient has been educated on providers schedule, contact  information, and patient/provider expectations.     FOLLOW UP: Return in about 4 weeks (around 11/17/2023) for MDD, MED CHANGES, Video visit.    PSYCHOTHERAPY:  In/Start Time: 1130.  Out/Stop Time: 1146.  16 minutes of face to face direct patient care with patient was spent for supportive psychotherapy including strengthening self awareness and insights, strengthening coping skills, counseling the patient regarding diagnoses, and utilizing cognitive behavioral therapy to assist the patient in recognizing more appropriate coping mechanisms which are proven effective in reducing the severity of frequency of symptoms.  This APRN assisted the patient in processing the patient's diagnoses, and also acknowledged and normalized the patient's thoughts, feelings, and concerns This APRN allowed the patient to freely discuss issues without interruption or judgment.      MEDICATION ISSUES:  [place]: Reviewed during F/U appt's via interface.    Medications Discontinued During This Encounter   Medication Reason    sertraline (Zoloft) 100 MG tablet Reorder     New Medications Ordered This Visit   Medications    sertraline (Zoloft) 100 MG tablet     Sig: Take 2 tablets by mouth Daily for 30 days.     Dispense:  60 tablet     Refill:  0        No orders of the defined types were placed in this encounter.    -Barriers: history of multiple failed medications due to lack of efficacy and/or side effects   -Strengths:  motivated     -Progress toward goal: Not at goal  -Functional Status: Severe impairment  -Prognosis: Guarded with Ongoing Treatment     SUMMARY/DISCUSSION:  Pt past social/medical/family history reviewed/updated. ROS conducted, medications/allergies, reviewed.  Most recent vitals/labs reviewed. Pt was given appropriate time to ask questions and concerns were addressed. A thorough discussion was had that included review of disease process, need for continued monitoring and additional treatment options including use of  pharmacological and non-pharmacological approaches to care, decisions were made and agreed upon by patient and provider. Discussed the risks, benefits, and potential side effects of the medications; patient ackowledged and verbally consented.     Part of this note may be an electronic transcription/translation of spoken language to printed text using the Dragon Dictation System. Some of the data in this electronic note has been brought forward from a previous encounter, any necessary changes have been made, it has been reviewed by this APRN, and it is accurate.    This document has been electronically signed by FRANCISCO JAVIER Leon October 20, 2023 14:00 EDT

## 2023-11-09 RX ORDER — TRAZODONE HYDROCHLORIDE 50 MG/1
50 TABLET ORAL NIGHTLY
Qty: 30 TABLET | Refills: 0 | Status: SHIPPED | OUTPATIENT
Start: 2023-11-09 | End: 2023-12-09

## 2023-11-15 DIAGNOSIS — G47.00 INSOMNIA, UNSPECIFIED TYPE: ICD-10-CM

## 2023-11-15 DIAGNOSIS — F33.2 SEVERE EPISODE OF RECURRENT MAJOR DEPRESSIVE DISORDER, WITHOUT PSYCHOTIC FEATURES: ICD-10-CM

## 2023-11-15 DIAGNOSIS — F41.1 GAD (GENERALIZED ANXIETY DISORDER): ICD-10-CM

## 2023-11-15 RX ORDER — BUPROPION HYDROCHLORIDE 300 MG/1
300 TABLET ORAL EVERY MORNING
Qty: 30 TABLET | Refills: 2 | Status: SHIPPED | OUTPATIENT
Start: 2023-11-15

## 2023-11-15 RX ORDER — SERTRALINE HYDROCHLORIDE 100 MG/1
200 TABLET, FILM COATED ORAL DAILY
Qty: 60 TABLET | Refills: 2 | Status: SHIPPED | OUTPATIENT
Start: 2023-11-15

## 2023-11-27 DIAGNOSIS — Z79.899 HIGH RISK MEDICATION USE: ICD-10-CM

## 2023-11-27 DIAGNOSIS — F33.2 SEVERE EPISODE OF RECURRENT MAJOR DEPRESSIVE DISORDER, WITHOUT PSYCHOTIC FEATURES: Primary | ICD-10-CM

## 2023-11-27 DIAGNOSIS — R53.83 FATIGUE, UNSPECIFIED TYPE: ICD-10-CM

## 2023-11-30 ENCOUNTER — LAB (OUTPATIENT)
Dept: LAB | Facility: HOSPITAL | Age: 33
End: 2023-11-30
Payer: COMMERCIAL

## 2023-11-30 PROCEDURE — 80061 LIPID PANEL: CPT | Performed by: NURSE PRACTITIONER

## 2023-11-30 PROCEDURE — 80050 GENERAL HEALTH PANEL: CPT

## 2023-11-30 PROCEDURE — 82306 VITAMIN D 25 HYDROXY: CPT

## 2023-11-30 PROCEDURE — 84439 ASSAY OF FREE THYROXINE: CPT

## 2023-11-30 PROCEDURE — 86803 HEPATITIS C AB TEST: CPT | Performed by: NURSE PRACTITIONER

## 2023-11-30 PROCEDURE — 82746 ASSAY OF FOLIC ACID SERUM: CPT

## 2023-11-30 PROCEDURE — 82607 VITAMIN B-12: CPT

## 2023-12-14 RX ORDER — TRAZODONE HYDROCHLORIDE 50 MG/1
50 TABLET ORAL NIGHTLY
Qty: 30 TABLET | Refills: 0 | Status: SHIPPED | OUTPATIENT
Start: 2023-12-14 | End: 2024-01-14

## 2024-02-02 ENCOUNTER — TELEMEDICINE (OUTPATIENT)
Dept: FAMILY MEDICINE CLINIC | Facility: TELEHEALTH | Age: 34
End: 2024-02-02
Payer: COMMERCIAL

## 2024-02-02 DIAGNOSIS — K08.89 PAIN, DENTAL: Primary | ICD-10-CM

## 2024-02-02 RX ORDER — TRAZODONE HYDROCHLORIDE 50 MG/1
50 TABLET ORAL NIGHTLY
Qty: 30 TABLET | Refills: 0 | Status: SHIPPED | OUTPATIENT
Start: 2024-02-02 | End: 2024-03-03

## 2024-02-02 RX ORDER — AMOXICILLIN AND CLAVULANATE POTASSIUM 875; 125 MG/1; MG/1
1 TABLET, FILM COATED ORAL 2 TIMES DAILY
Qty: 14 TABLET | Refills: 0 | Status: SHIPPED | OUTPATIENT
Start: 2024-02-02 | End: 2024-02-04 | Stop reason: SDUPTHER

## 2024-02-02 NOTE — PATIENT INSTRUCTIONS
Call dentist office. Verify antibiotic coverage. Go to ED or [facility]UC if pain is not improved in 24-48 hours, fever spikes or uncontrolled pain.

## 2024-02-02 NOTE — PROGRESS NOTES
"No chief complaint on file.      Video Visit Reason:   Free Text Description: Had dental work done yesterday - debridement of gums for infection. Dentist mentioned an antibiotic, but I realized last night that there was not one in the bag. Fever/chills/sweats last night and horrible headache.  Subjective   Linda Li is a 33 y.o. female.     History of Present Illness  Dental procedure yesterday with gum debridement. Fever during the night with chills and increasing pain. She has called the dentist office but they are closed today. She has left message on their \"emergency\" line over 1.5 hours ago without response.       The following portions of the patient's history were reviewed and updated as appropriate: allergies, current medications, past medical history, and problem list.      Past Medical History:   Diagnosis Date    Abnormal ECG 1/6/23    Covid positive.    Anxiety     Depression     Dyspnea     Essential hypertension     Fissure, anal Spring 2021    Head injury     Migraine     Migraines     Rectal bleeding     Only with the hemorrhoids and fissure    Tachycardia      Social History     Socioeconomic History    Marital status: Single    Number of children: 0    Highest education level: Master's degree (e.g., MA, MS, Cierra, MEd, MSW, CHANDANA)   Tobacco Use    Smoking status: Never    Smokeless tobacco: Never   Vaping Use    Vaping Use: Never used   Substance and Sexual Activity    Alcohol use: Yes     Comment: Social    Drug use: No    Sexual activity: Not Currently     Partners: Female     Birth control/protection: Same-sex partner     medication documentation: reviewed and updated with patient and   Current Outpatient Medications:     buPROPion XL (Wellbutrin XL) 300 MG 24 hr tablet, Take 1 tablet by mouth Every Morning., Disp: 30 tablet, Rfl: 2    chlorhexidine (PERIDEX) 0.12 % solution, Swish for 30 seconds and Spit 1/2 ounce twice daily., Disp: 473 mL, Rfl: 2    sertraline (Zoloft) 100 MG tablet, Take 2 " tablets by mouth Daily., Disp: 60 tablet, Rfl: 2    traZODone (DESYREL) 50 MG tablet, Take 1 tablet by mouth Every Night for 30 days., Disp: 30 tablet, Rfl: 0    amoxicillin-clavulanate (AUGMENTIN) 875-125 MG per tablet, Take 1 tablet by mouth 2 (Two) Times a Day for 7 days., Disp: 14 tablet, Rfl: 0  Review of Systems   Constitutional:  Positive for chills and fever.   HENT:  Positive for dental problem. Negative for sore throat.    Respiratory:  Negative for shortness of breath.        Objective   Physical Exam  Constitutional:       General: She is not in acute distress.     Appearance: She is not ill-appearing.   HENT:      Mouth/Throat:      Dentition: Dental tenderness and gingival swelling present.   Eyes:      Conjunctiva/sclera: Conjunctivae normal.   Pulmonary:      Effort: Pulmonary effort is normal.   Neurological:      Mental Status: She is alert.   Psychiatric:         Mood and Affect: Mood normal.         Assessment & Plan   Diagnoses and all orders for this visit:    1. Pain, dental (Primary)  -     amoxicillin-clavulanate (AUGMENTIN) 875-125 MG per tablet; Take 1 tablet by mouth 2 (Two) Times a Day for 7 days.  Dispense: 14 tablet; Refill: 0                    Follow Up:  If your symptoms are not resolving by the completion of your treatment or are worsening, see your primary care provider for follow up. If you don't have a primary care provider, you may go to any Urgent Care for re-evaluation. If you develop any life threatening symptoms, go to the nearest Emergency Department immediately or call EMS.               The use of  Video Visit was utilized during this visit, using both [employer] and [employer]pic. The use of a video visit has been reviewed with the patient and verbal informed consent has been obtained. No technical difficulties occurred during the visit.    is located at [address] [place] [address]  Provider is located at [place], [place]

## 2024-11-06 ENCOUNTER — OFFICE VISIT (OUTPATIENT)
Dept: SPORTS MEDICINE | Facility: CLINIC | Age: 34
End: 2024-11-06
Payer: COMMERCIAL

## 2024-11-06 ENCOUNTER — TELEPHONE (OUTPATIENT)
Dept: SPORTS MEDICINE | Facility: CLINIC | Age: 34
End: 2024-11-06

## 2024-11-06 VITALS
WEIGHT: 260 LBS | TEMPERATURE: 97.5 F | OXYGEN SATURATION: 99 % | SYSTOLIC BLOOD PRESSURE: 110 MMHG | RESPIRATION RATE: 14 BRPM | DIASTOLIC BLOOD PRESSURE: 80 MMHG | HEART RATE: 93 BPM | BODY MASS INDEX: 35.21 KG/M2 | HEIGHT: 72 IN

## 2024-11-06 DIAGNOSIS — M53.3 COCCYGEAL PAIN, ACUTE: ICD-10-CM
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
## 2024-11-06 NOTE — TELEPHONE ENCOUNTER
Patient was seen in office this morning.  Patient is requesting Referral to Pain Mangement be changed to [facility] vs. To Dr. Milan. She states that she can get an appointment sooner if referred this way.    Patient is requesting a call back from MA    Please advise.

## 2024-11-07 ENCOUNTER — ANESTHESIA EVENT (OUTPATIENT)
Dept: PAIN MEDICINE | Facility: HOSPITAL | Age: 34
End: 2024-11-07
Payer: COMMERCIAL

## 2024-11-07 ENCOUNTER — ANESTHESIA (OUTPATIENT)
Dept: PAIN MEDICINE | Facility: HOSPITAL | Age: 34
End: 2024-11-07
Payer: COMMERCIAL

## 2024-11-07 ENCOUNTER — HOSPITAL ENCOUNTER (OUTPATIENT)
Dept: PAIN MEDICINE | Facility: HOSPITAL | Age: 34
Discharge: HOME | End: 2024-11-07
Payer: COMMERCIAL

## 2024-11-07 ENCOUNTER — HOSPITAL ENCOUNTER (OUTPATIENT)
Dept: GENERAL RADIOLOGY | Facility: HOSPITAL | Age: 34
Discharge: HOME | End: 2024-11-07
Payer: COMMERCIAL

## 2024-11-07 VITALS
OXYGEN SATURATION: 100 % | DIASTOLIC BLOOD PRESSURE: 86 MMHG | RESPIRATION RATE: 16 BRPM | SYSTOLIC BLOOD PRESSURE: 122 MMHG | TEMPERATURE: 97.3 F | HEART RATE: 86 BPM

## 2024-11-07 DIAGNOSIS — M53.3 COCCYX PAIN: ICD-10-CM

## 2024-11-07 DIAGNOSIS — M53.3 COCCYDYNIA: Primary | ICD-10-CM

## 2024-11-07 PROCEDURE — 25010000002 METHYLPREDNISOLONE PER 80 MG: Performed by: ANESTHESIOLOGY

## 2024-11-07 PROCEDURE — 25010000002 MIDAZOLAM PER 1 MG: Performed by: ANESTHESIOLOGY

## 2024-11-07 PROCEDURE — 77003 FLUOROGUIDE FOR SPINE INJECT: CPT

## 2024-11-07 PROCEDURE — 25010000002 FENTANYL CITRATE (PF) 50 MCG/ML SOLUTION: Performed by: ANESTHESIOLOGY

## 2024-11-07 RX ORDER — SODIUM CHLORIDE 0.9 % (FLUSH) 0.9 %
10 SYRINGE (ML) INJECTION EVERY 12 HOURS SCHEDULED
Status: DISCONTINUED | OUTPATIENT
Start: 2024-11-07 | End: 2024-11-08 | Stop reason: HOSPADM

## 2024-11-07 RX ORDER — FENTANYL CITRATE 50 UG/ML
50 INJECTION, SOLUTION INTRAMUSCULAR; INTRAVENOUS ONCE
Status: COMPLETED | OUTPATIENT
Start: 2024-11-07 | End: 2024-11-07

## 2024-11-07 RX ORDER — SODIUM CHLORIDE 9 MG/ML
40 INJECTION, SOLUTION INTRAVENOUS AS NEEDED
Status: DISCONTINUED | OUTPATIENT
Start: 2024-11-07 | End: 2024-11-08 | Stop reason: HOSPADM

## 2024-11-07 RX ORDER — BUPIVACAINE HYDROCHLORIDE AND EPINEPHRINE 2.5; 5 MG/ML; UG/ML
30 INJECTION, SOLUTION EPIDURAL; INFILTRATION; INTRACAUDAL; PERINEURAL ONCE
Status: COMPLETED | OUTPATIENT
Start: 2024-11-07 | End: 2024-11-07

## 2024-11-07 RX ORDER — METHYLPREDNISOLONE ACETATE 80 MG/ML
80 INJECTION, SUSPENSION INTRA-ARTICULAR; INTRALESIONAL; INTRAMUSCULAR; SOFT TISSUE ONCE
Status: COMPLETED | OUTPATIENT
Start: 2024-11-07 | End: 2024-11-07

## 2024-11-07 RX ORDER — SODIUM CHLORIDE 0.9 % (FLUSH) 0.9 %
10 SYRINGE (ML) INJECTION AS NEEDED
Status: DISCONTINUED | OUTPATIENT
Start: 2024-11-07 | End: 2024-11-08 | Stop reason: HOSPADM

## 2024-11-07 RX ORDER — LIDOCAINE HYDROCHLORIDE 10 MG/ML
1 INJECTION, SOLUTION INFILTRATION; PERINEURAL ONCE
Status: DISCONTINUED | OUTPATIENT
Start: 2024-11-07 | End: 2024-11-08 | Stop reason: HOSPADM

## 2024-11-07 RX ORDER — MIDAZOLAM HYDROCHLORIDE 1 MG/ML
1 INJECTION, SOLUTION INTRAMUSCULAR; INTRAVENOUS ONCE AS NEEDED
Status: COMPLETED | OUTPATIENT
Start: 2024-11-07 | End: 2024-11-07

## 2024-11-07 RX ADMIN — METHYLPREDNISOLONE ACETATE 80 MG: 80 INJECTION, SUSPENSION INTRA-ARTICULAR; INTRALESIONAL; INTRAMUSCULAR; SOFT TISSUE at 15:08

## 2024-11-07 RX ADMIN — FENTANYL CITRATE 50 MCG: 50 INJECTION, SOLUTION INTRAMUSCULAR; INTRAVENOUS at 15:07

## 2024-11-07 RX ADMIN — MIDAZOLAM 2 MG: 1 INJECTION INTRAMUSCULAR; INTRAVENOUS at 15:06

## 2024-11-07 RX ADMIN — BUPIVACAINE HYDROCHLORIDE AND EPINEPHRINE BITARTRATE 30 ML: 2.5; .0091 INJECTION, SOLUTION EPIDURAL; INFILTRATION; INTRACAUDAL; PERINEURAL at 15:08

## 2024-11-07 NOTE — DISCHARGE INSTRUCTIONS

## 2024-11-07 NOTE — H&P
[facility]    History and Physical    Patient Name: Linda Li  :  1990  MRN:  [id number]  Date of Admission: 2024    Subjective     Patient is a 34 y.o. female presents with chief complaint of acute  coccyx  pain.  Onset of symptoms was abrupt starting a few days ago.  Symptoms are associated/aggravated by nothing in particular, activity, sitting, or standing. Symptoms improve with nothing    The following portions of the patients history were reviewed and updated as appropriate: current medications, allergies, past medical history, past surgical history, past family history, past social history, and problem list      Ms. Li suffered a recent fall and has a coccyx fracture which is severely painful.  We have been asked to do a coccyx injection.  I will attempt a ganglion of impar injection but may end up just doing injection around the coccyx if I am unable to visualize the segments          Objective     Past Medical History:   Past Medical History:   Diagnosis Date   • Abnormal ECG 23    Covid positive.   • Ankle sprain    • Anxiety    • Depression    • Dyspnea    • Essential hypertension    • Fissure, anal Spring    • Fracture of ankle    • Head injury    • Injury of back    • Migraine    • Migraines    • Obesity    • Rectal bleeding     Only with the hemorrhoids and fissure   • Tachycardia    • Visual impairment      Past Surgical History:   Past Surgical History:   Procedure Laterality Date   • HEMORRHOIDECTOMY N/A 12/10/2020    Procedure: excision of thrombosed hemorrhoid;  Surgeon: Geri Anglin MD;  [facility];  Service: General;  Laterality: N/A;   • WISDOM TOOTH EXTRACTION       Family History:   Family History   Problem Relation Age of Onset   • Breast cancer Mother    • Cancer Mother         Breast Cancer   • Leukemia Father         CLL   • Cancer Father         Leukemia   • Alcohol abuse Maternal Grandfather    • Heart disease Maternal Grandfather    •  Stroke Maternal Grandfather    • Diabetes Maternal Grandfather    • Heart disease Maternal Grandmother    • Diabetes Maternal Grandmother    • Alcohol abuse Paternal Grandfather    • Colon cancer Paternal Grandfather    • Heart attack Paternal Grandmother         < 60 yoa     Social History:   Social History     Socioeconomic History   • Marital status: Single   • Number of children: 0   • Highest education level: Master's degree (e.g., MA, MS, Cierra, MEd, MSW, CHANDANA)   Tobacco Use   • Smoking status: Never   • Smokeless tobacco: Never   Vaping Use   • Vaping status: Never Used   Substance and Sexual Activity   • Alcohol use: Yes     Comment: Social   • Drug use: No   • Sexual activity: Not Currently     Partners: Female     Birth control/protection: Partner of same sex       Vital Signs Range for the last 24 hours  Temperature: Temp:  [36.3 °C (97.3 °F)] 36.3 °C (97.3 °F)   Temp Source: Temp src: Infrared   BP: BP: (137)/(86) 137/86   Pulse: Heart Rate:  [77] 77   Respirations: Resp:  [16] 16   SPO2: SpO2:  [98 %] 98 %   O2 Amount (l/min):     O2 Devices Device (Oxygen Therapy): room air   Weight:           --------------------------------------------------------------------------------    Current Outpatient Medications   Medication Sig Dispense Refill   • HYDROcodone-acetaminophen (NORCO) 5-325 MG per tablet Take 1 tablet by mouth Every 6 (Six) Hours As Needed for Moderate Pain. 12 tablet 0   • meloxicam (MOBIC) 15 MG tablet Take 1 tablet by mouth Daily. 14 tablet 0   • buPROPion XL (Wellbutrin XL) 300 MG 24 hr tablet Take 1 tablet by mouth Every Morning. (Patient not taking: Reported on 11/6/2024) 30 tablet 2   • chlorhexidine (PERIDEX) 0.12 % solution Swish for 30 seconds and Spit 1/2 ounce twice daily. (Patient not taking: Reported on 11/6/2024) 473 mL 2   • Ibuprofen 3 %, Gabapentin 10 %, Baclofen 2 %, lidocaine 4 % Apply 1-2 g topically to the appropriate area as directed 3 (Three) to 4 (Four) times daily. 240 g  1   • sertraline (Zoloft) 100 MG tablet Take 2 tablets by mouth Daily. (Patient not taking: Reported on 11/6/2024) 60 tablet 2   • traZODone (DESYREL) 50 MG tablet Take 1 tablet by mouth Every Night for 30 days. 30 tablet 0     Current Facility-Administered Medications   Medication Dose Route Frequency Provider Last Rate Last Admin   • bupivacaine-EPINEPHrine PF (MARCAINE w/EPI) 0.25% -[id number] injection 30 mL  30 mL Infiltration Once Render, Kem Pablo MD       • fentaNYL citrate (PF) (SUBLIMAZE) injection 50 mcg  50 mcg Intravenous Once Render, Kem Pablo MD       • lidocaine (XYLOCAINE) 1 % injection 1 mL  1 mL Intradermal Once Render, Kem Pablo MD       • methylPREDNISolone acetate (DEPO-medrol) injection 80 mg  80 mg Epidural Once Render, Kem Pablo MD       • midazolam (VERSED) injection 1 mg  1 mg Intravenous Once PRN Render, Kem Pablo MD       • sodium chloride 0.9 % flush 10 mL  10 mL Intravenous Q12H Render, Kem Pablo MD       • sodium chloride 0.9 % flush 10 mL  10 mL Intravenous PRN Render, Kem Pablo MD       • sodium chloride 0.9 % infusion 40 mL  40 mL Intravenous PRN Render, Kem Pablo MD           --------------------------------------------------------------------------------  Assessment & Plan      Anesthesia Evaluation           Pain impairs ability to perform ADLs: Ambulation, Sleeping, Exercise/Activity and Working  Modalities previously tried to control pain with limited effectiveness within the last 4-6 weeks: OTC medications     Airway   Mallampati: II  TM distance: >3 FB  Dental      Pulmonary    (-) wheezes  Cardiovascular     Rhythm: regular    (+) hypertension      Neuro/Psych- neuro exam normal  GI/Hepatic/Renal/Endo    (+) GI bleeding     Musculoskeletal         PE comment: She has marked tenderness over her coccyx.  Abdominal    Substance History      OB/GYN          Other                 Diagnosis and Plan    Treatment Plan  ASA 2      Procedures: Nerve block type: Coccyx  injection., With fluoroscopy,      Anesthetic plan and risks discussed with patient.      She stands the risks including but not limited to bleeding, infection and adverse reaction to local anesthetic or steroid.  Diagnosis     * Coccydynia [M53.3]

## 2024-11-07 NOTE — ANESTHESIA PROCEDURE NOTES
Coccyx injection    Pre-sedation assessment completed: 11/7/2024 3:07 PM    Patient reassessed immediately prior to procedure    Patient location during procedure: [facility]  Start time: 11/7/2024 3:08 PM  Stop time: 11/7/2024 3:23 PM    Reason for block: procedure for pain  Performed by  Anesthesiologist: Kem Heredia MD  Preanesthetic Checklist  Completed: patient identified, IV checked, site marked, risks and benefits discussed, surgical consent, monitors and equipment checked, pre-op evaluation and timeout performed  Prep:  Sterile Tech:cap, gloves and mask  Monitoring:EKG, continuous pulse oximetry and blood pressure monitoring  Procedure:  Sedation:yes  Guidance:Fluoroscopic  Muscle Group:1  Needle Gauge:25 G  Medications:  Depomedrol:80  Comment:Sedation time was 1508 until 1523    She has coccydynia and I was going to initially attempt a ganglion of impar injection however she was so tender I was unable to traverse the coccygeal segments.  I initially identified midline with AP fluoroscopy and then turned lateral.  Anatomy was somewhat difficult to discern but I could see some coccygeal segments.  I initially attempted to pass a needle through a coccygeal segment however this was extremely painful.  I slowly advanced the needle down to the periosteum of the coccyx and slowly injected 5 mL of 0.25% bupivacaine with epinephrine.  I then returned to the AP plane and project the needle to the right side of the coccyx and just as it passed lateral to the coccyx injected another 3 mL of 0.25% bupivacaine.  I then injected 2.5 mL of a solution of 0.25% bupivacaine and 80 mg of Depo-Medrol.  I repeated that procedure on the left side as well and attempting to surround the coccyx with local anesthetic and steroid.  Needle tip position was also checked in the lateral plane while injecting on the lateral sides.  She still did continue to have some discomfort however she tolerated procedure well.    Post  Assessment  Patient Tolerance:comfortable throughout block  Complications:no

## 2024-11-08 ENCOUNTER — PATIENT ROUNDING (BHMG ONLY) (OUTPATIENT)
Dept: SPORTS MEDICINE | Facility: CLINIC | Age: 34
End: 2024-11-08
Payer: COMMERCIAL

## 2024-11-08 NOTE — PROGRESS NOTES
November 8, 2024    A [place] Message has been sent to the patient for [place] [facility]

## 2024-11-14 ENCOUNTER — TELEPHONE (OUTPATIENT)
Dept: SPORTS MEDICINE | Facility: CLINIC | Age: 34
End: 2024-11-14
Payer: COMMERCIAL

## 2024-11-14 DIAGNOSIS — M53.3 COCCYGEAL PAIN, ACUTE: ICD-10-CM
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
## 2024-11-14 RX ORDER — LIDOCAINE 50 MG/G
1 PATCH TOPICAL EVERY 24 HOURS
Qty: 14 EACH | Refills: 1 | Status: SHIPPED | OUTPATIENT
Start: 2024-11-14

## 2024-11-14 RX ORDER — GABAPENTIN 300 MG/1
300 CAPSULE ORAL 3 TIMES DAILY
Qty: 90 CAPSULE | Refills: 0 | Status: SHIPPED | OUTPATIENT
Start: 2024-11-14

## 2024-11-14 NOTE — TELEPHONE ENCOUNTER
[employer] forms received today via fax.   Please contact patient for payment of forms, will be completed either today or tomorrow with Dr. Kimbrough.     Thank you   Ashley

## 2024-12-02 ENCOUNTER — OFFICE VISIT (OUTPATIENT)
Dept: SPORTS MEDICINE | Facility: CLINIC | Age: 34
End: 2024-12-02
Payer: COMMERCIAL

## 2024-12-02 VITALS
WEIGHT: 260 LBS | HEART RATE: 98 BPM | BODY MASS INDEX: 35.21 KG/M2 | RESPIRATION RATE: 16 BRPM | OXYGEN SATURATION: 99 % | DIASTOLIC BLOOD PRESSURE: 70 MMHG | SYSTOLIC BLOOD PRESSURE: 128 MMHG | HEIGHT: 72 IN
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
## 2024-12-02 DIAGNOSIS — M53.3 COCCYGEAL PAIN, ACUTE: ICD-10-CM

## 2024-12-02 PROCEDURE — 99214 OFFICE O/P EST MOD 30 MIN: CPT | Performed by: FAMILY MEDICINE

## 2024-12-02 NOTE — PROGRESS NOTES
"Linda is a 34 y.o. year old female presents to [facility]    Chief Complaint   Patient presents with    Tailbone Pain     F/u eval for coccyx pain - reports sitting to stand is more painful, prolonged standing or sitting, still have a lot of pain - here for further evaluation and treatment        History of Present Illness  History of Present Illness  The patient is a 34-year-old female who presents for evaluation of a tailbone fracture.    She reports persistent discomfort, particularly when sitting, standing, or walking for extended periods. Despite being in the fifth week post-injury, she finds it challenging to maintain her usual work schedule of 50 to 60 hours per week. She can manage activities other than lying down for about 2 hours before needing to rest due to soreness.    She has noticed some improvement, as she can now bend over, dress herself, and walk slowly, which were previously difficult tasks. Her sleep is disturbed due to the pain, and she is currently taking gabapentin three times a day and meloxicam for pain relief. She is considering the use of a TENS unit for pain management.    I have reviewed the patient's medical, family, and social history in detail and updated the computerized patient record.    /70 (BP Location: Right arm, Patient Position: Standing, Cuff Size: Large Adult)   Pulse 98   Resp 16   Ht 182.9 cm (72.01\")   Wt 118 kg (260 lb)   SpO2 99%   BMI 35.25 kg/m²      Physical Exam    Vital signs reviewed.   General: No acute distress.  Eyes: conjunctiva clear; pupils equally round and reactive  ENT: external ears atraumatic  CV: no peripheral edema  Resp: normal respiratory effort, no use of accessory muscles  Skin: no rashes or wounds; normal turgor  Psych: mood and affect appropriate; recent and remote memory intact  Neuro: sensation to light touch intact    MSK Exam  Physical Exam  Tenderness is present along the lower sacrum and " coccyx, though this is improved. Gait is normal.          Results           Diagnoses and all orders for this visit:    Fall, subsequent encounter    Coccygeal pain, acute      Assessment & Plan  1. Tailbone fracture.  The patient is experiencing pain and limited mobility due to a tailbone fracture. A CT scan was discussed as an option to further evaluate the fracture, but it was decided to hold off on imaging for now. She was advised to continue her current regimen of gabapentin 300 mg twice a day and meloxicam. The use of a Lidoderm patch at nighttime was recommended to help with sleep disturbances caused by pain. The potential use of a TENS unit was discussed, but it was determined that it would likely not be beneficial for bone pain.    Follow-up  Return in 4 weeks for follow up.      I spent 30 minutes caring for Linda on this date of service. This time includes time spent by me in the following activities:preparing for the visit, reviewing tests, obtaining and/or reviewing a separately obtained history, performing a medically appropriate examination and/or evaluation , counseling and educating the patient/family/caregiver, documenting information in the medical record, and independently interpreting results and communicating that information with the patient/family/caregiver    Follow Up     Patient was given instructions and counseling regarding her condition or for health maintenance advice. Please see specific information pulled into the AVS if appropriate.     Patient or patient representative verbalized consent for the use of Ambient Listening during the visit with  SHAHEED Kimbrough Jr.,  for chart documentation. 12/2/2024  18:20 EST

## 2025-04-01 ENCOUNTER — TELEMEDICINE (OUTPATIENT)
Dept: FAMILY MEDICINE CLINIC | Facility: TELEHEALTH | Age: 35
End: 2025-04-01
Payer: COMMERCIAL

## 2025-04-01 DIAGNOSIS — J06.9 UPPER RESPIRATORY TRACT INFECTION, UNSPECIFIED TYPE: Primary | ICD-10-CM

## 2025-04-01 RX ORDER — DEXTROMETHORPHAN HYDROBROMIDE AND PROMETHAZINE HYDROCHLORIDE 15; 6.25 MG/5ML; MG/5ML
5 SYRUP ORAL 4 TIMES DAILY PRN
Qty: 180 ML | Refills: 0 | Status: SHIPPED | OUTPATIENT
Start: 2025-04-01 | End: 2025-04-15

## 2025-04-01 RX ORDER — METHYLPREDNISOLONE 4 MG/1
TABLET ORAL
Qty: 21 TABLET | Refills: 0 | Status: SHIPPED | OUTPATIENT
Start: 2025-04-01

## 2025-04-01 RX ORDER — GUAIFENESIN AND DEXTROMETHORPHAN HYDROBROMIDE 600; 30 MG/1; MG/1
1 TABLET, EXTENDED RELEASE ORAL 2 TIMES DAILY PRN
Qty: 20 TABLET | Refills: 0 | Status: SHIPPED | OUTPATIENT
Start: 2025-04-01

## 2025-04-01 NOTE — PROGRESS NOTES
You have chosen to receive care through a telehealth visit.  Do you consent to use a video/audio connection for your medical care today? Yes     HPI  History of Present Illness  The patient presents via virtual visit for evaluation of throat discomfort and chest pressure.    She began experiencing symptoms last night, characterized by a sensation akin to razor blades in her throat during swallowing. This was accompanied by significant congestion. She also reports a new onset of chest pressure and cough today, with the pressure being more pronounced in her left ear than the right. Her voice has also been affected. She is not experiencing any fever or body aches. She has been managing these symptoms with Stahist AD and Claritin, but they persist. She had some allergies over the weekend. She is not on any other medications. She does not smoke. She has received her influenza vaccine this year. She does not believe she is pregnant.    SOCIAL HISTORY  She does not smoke.    MEDICATIONS  Stahist AD, Claritin    IMMUNIZATIONS  She has received her influenza vaccine this year.    Review of Systems: See HPI    Past Medical History:   Diagnosis Date    Abnormal ECG 1/6/23    Covid positive.    Ankle sprain     Anxiety     Depression     Dyspnea     Essential hypertension     Fissure, anal Spring 2021    Fracture of ankle     Head injury     Injury of back     Migraine     Migraines     Obesity     Rectal bleeding     Only with the hemorrhoids and fissure    Tachycardia     Visual impairment        Family History   Problem Relation Age of Onset    Breast cancer Mother     Cancer Mother         Breast Cancer    Leukemia Father         CLL    Cancer Father         Leukemia    Alcohol abuse Maternal Grandfather     Heart disease Maternal Grandfather     Stroke Maternal Grandfather     Diabetes Maternal Grandfather     Heart disease Maternal Grandmother     Diabetes Maternal Grandmother     Alcohol abuse Paternal Grandfather      Colon cancer Paternal Grandfather     Heart attack Paternal Grandmother         < 60 yoa       Social History     Socioeconomic History    Marital status: Single    Number of children: 0    Highest education level: Master's degree (e.g., MA, MS, Cierra, MEd, MSW, CHANDANA)   Tobacco Use    Smoking status: Never    Smokeless tobacco: Never   Vaping Use    Vaping status: Never Used   Substance and Sexual Activity    Alcohol use: Yes     Comment: Social    Drug use: No    Sexual activity: Not Currently     Partners: Female     Birth control/protection: Partner of same sex         There were no vitals taken for this visit.    PHYSICAL EXAM  Physical Exam   Constitutional: She appears well-developed and well-nourished. She does not have a sickly appearance. She does not appear ill. No distress.   HENT:   Head: Normocephalic and atraumatic.   Right Ear: No drainage, swelling or tenderness.   Left Ear: No drainage, swelling or tenderness.   Nose: Rhinorrhea and congestion present. Right sinus exhibits no maxillary sinus tenderness and no frontal sinus tenderness. Left sinus exhibits no maxillary sinus tenderness and no frontal sinus tenderness.   Mouth/Throat: Mouth/Lips are normal.  Pulmonary/Chest: Effort normal.  No respiratory distress. She no audible wheeze...  Neurological: She is alert.   Psychiatric: She has a normal mood and affect.   Vitals reviewed.    Physical Exam      Diagnoses and all orders for this visit:    1. Upper respiratory tract infection, unspecified type (Primary)  -     guaifenesin-dextromethorphan 600-30 mg (MUCINEX DM)  MG tablet sustained-release 12 hour; Take 1 tablet by mouth 2 (Two) Times a Day As Needed (cough and congestion).  Dispense: 20 tablet; Refill: 0  -     promethazine-dextromethorphan (PROMETHAZINE-DM) 6.25-15 MG/5ML syrup; Take 5 mL by mouth 4 (Four) Times a Day As Needed for Cough for up to 14 days.  Dispense: 180 mL; Refill: 0  -     methylPREDNISolone (MEDROL) 4 MG dose pack;  Take as directed on package instructions. Take with food.  Dispense: 21 tablet; Refill: 0  -     Influenza-SARS At Home Test kit; 1 kit by In Vitro route 1 (One) Time for 1 dose.  Dispense: 1 kit; Refill: 0      Assessment & Plan  1. Throat discomfort and chest pressure.  The etiology of the cough and other associated symptoms is likely viral, given the recent onset of these symptoms. An antibiotic is not necessary at this time. She has been advised to discontinue Claritin while on Stahist AD. A prescription for promethazine-containing nighttime cough syrup has been provided. Additionally, Mucinex DM has been prescribed, to be taken twice daily with a full glass of water. A Medrol Dosepak has also been prescribed, with instructions to take it with food either all at once or divided into two doses, one in the morning and the other in the early afternoon. She has been encouraged to maintain adequate hydration throughout the day. A combo test for influenza and COVID-19 will be ordered.      FOLLOW-UP  As discussed during visit with [facility], if symptoms worsen or fail to improve, follow-up with PCP/Urgent Care/Emergency Department.    Patient verbalizes understanding of medications, instructions for treatment and follow-up.    Patient or patient representative verbalized consent for the use of Ambient Listening during the visit with  FRANCISCO JAVIER Hernandez for chart documentation. 4/1/2025  11:41 EDT    FRANCISCO JAVIER Hernandez  04/01/2025  11:41 EDT    The use of a video visit has been reviewed with the patient and verbal informed consent has been obtained. Myself and Linda Li participated in this visit. The patient is located in [facility], and I am located in [place], [place]. [employer] and [employer]  were utilized.

## (undated) DEVICE — NDL HYPO PRECISIONGLIDE REG 25G 1 1/2

## (undated) DEVICE — SUT VIC 3/0 SH 27IN J416H

## (undated) DEVICE — PANTY KNIT WASHABLE LG/XL BRN/GRN LF

## (undated) DEVICE — GAUZE,SPONGE,FLUFF,6"X6.75",STRL,10/TRAY: Brand: MEDLINE

## (undated) DEVICE — GLV SURG BIOGEL LTX PF 6 1/2

## (undated) DEVICE — PK PROC MINOR TOWER 40

## (undated) DEVICE — ELECTRD BLD EZ CLN MOD XLNG 2.75IN

## (undated) DEVICE — SUT GUT CHRM 3/0 SH 27IN G122H

## (undated) DEVICE — PAD,ABDOMINAL,8"X10",ST,LF: Brand: MEDLINE